# Patient Record
Sex: FEMALE | Race: WHITE | Employment: FULL TIME | URBAN - METROPOLITAN AREA
[De-identification: names, ages, dates, MRNs, and addresses within clinical notes are randomized per-mention and may not be internally consistent; named-entity substitution may affect disease eponyms.]

---

## 2017-01-12 ENCOUNTER — TELEPHONE (OUTPATIENT)
Dept: FAMILY MEDICINE CLINIC | Facility: CLINIC | Age: 27
End: 2017-01-12

## 2017-01-12 ENCOUNTER — OFFICE VISIT (OUTPATIENT)
Dept: FAMILY MEDICINE CLINIC | Facility: CLINIC | Age: 27
End: 2017-01-12

## 2017-01-12 VITALS
SYSTOLIC BLOOD PRESSURE: 108 MMHG | RESPIRATION RATE: 18 BRPM | HEART RATE: 53 BPM | WEIGHT: 125 LBS | BODY MASS INDEX: 23.6 KG/M2 | HEIGHT: 61 IN | DIASTOLIC BLOOD PRESSURE: 64 MMHG | TEMPERATURE: 98 F

## 2017-01-12 DIAGNOSIS — J06.9 UPPER RESPIRATORY TRACT INFECTION, UNSPECIFIED TYPE: ICD-10-CM

## 2017-01-12 DIAGNOSIS — H69.83 ETD (EUSTACHIAN TUBE DYSFUNCTION), BILATERAL: Primary | ICD-10-CM

## 2017-01-12 PROCEDURE — 99212 OFFICE O/P EST SF 10 MIN: CPT | Performed by: FAMILY MEDICINE

## 2017-01-12 PROCEDURE — 99213 OFFICE O/P EST LOW 20 MIN: CPT | Performed by: FAMILY MEDICINE

## 2017-01-12 RX ORDER — PREDNISONE 10 MG/1
30 TABLET ORAL DAILY
Qty: 15 TABLET | Refills: 0 | Status: SHIPPED | OUTPATIENT
Start: 2017-01-12 | End: 2017-01-17

## 2017-01-12 RX ORDER — FLUTICASONE PROPIONATE 50 MCG
2 SPRAY, SUSPENSION (ML) NASAL DAILY
Qty: 1 BOTTLE | Refills: 0 | Status: SHIPPED | OUTPATIENT
Start: 2017-01-12 | End: 2017-05-12

## 2017-01-12 NOTE — TELEPHONE ENCOUNTER
Per pt, pt is having possible URI; ear pain, sore throat, and also stuffy nose, congestion but no fever. Pt took Mucinex but did not help much,  Pt would like to come and see VS today if possible,  Offered appt with other dr but refused.

## 2017-01-12 NOTE — PROGRESS NOTES
Patient ID: Lawrence Rosen is a 27year old female. HPI  Patient presents with:  Cold: Pt c/o bilateral ear pain/pressure, throat pain, sinus congestion, and cough for past 2 days. Pain 5/10.      She states for the last 2 days she has had some sinus co reviewed. Blood pressure 108/64, pulse 53, temperature 98.2 °F (36.8 °C), temperature source Oral, resp. rate 18, height 5' 1\" (1.549 m), weight 125 lb (56.7 kg), not currently breastfeeding.          ASSESSMENT/PLAN:     Diagnoses and all orders for th

## 2017-01-12 NOTE — TELEPHONE ENCOUNTER
Pt informed of response below by VS; pt states can come in at 2:45pm today and was added with assistance of CSS.

## 2017-01-12 NOTE — TELEPHONE ENCOUNTER
Patient states for two days has had bilateral ear pain, sore throat, stuffy nose, head congestion, chest congestion. Denies fever, SOB, chest pain. No SDS appts available today.  Patient has a meeting from 1-2PM today in [place] but can come in any ti

## 2017-01-16 ENCOUNTER — TELEPHONE (OUTPATIENT)
Dept: GASTROENTEROLOGY | Facility: CLINIC | Age: 27
End: 2017-01-16

## 2017-01-16 ENCOUNTER — OFFICE VISIT (OUTPATIENT)
Dept: PODIATRY CLINIC | Facility: CLINIC | Age: 27
End: 2017-01-16

## 2017-01-16 DIAGNOSIS — M77.41 METATARSALGIA OF BOTH FEET: Primary | ICD-10-CM

## 2017-01-16 DIAGNOSIS — M77.42 METATARSALGIA OF BOTH FEET: Primary | ICD-10-CM

## 2017-01-16 PROCEDURE — L3060 FOOT ARCH SUPP LONGITUD/META: HCPCS | Performed by: PODIATRIST

## 2017-01-16 PROCEDURE — 99243 OFF/OP CNSLTJ NEW/EST LOW 30: CPT | Performed by: PODIATRIST

## 2017-01-16 PROCEDURE — 99212 OFFICE O/P EST SF 10 MIN: CPT | Performed by: PODIATRIST

## 2017-01-16 NOTE — PROGRESS NOTES
HPI:    Patient ID: Marcos Ch is a 27year old female. HPI  This pleasant 47-year-old female presents as a new patient to me on consult from [address] Patient's chief complaint is pain on the ball of her foot where there is a callus.   She states range of motion of any of the metatarsophalangeal joints. There seems to be adequate ankle joint range of motion and good subtalar and midtarsal joint ranges of motion as well. She has a moderately pronated functional position.   Some shoes such as gym st

## 2017-01-30 ENCOUNTER — OFFICE VISIT (OUTPATIENT)
Dept: FAMILY MEDICINE CLINIC | Facility: CLINIC | Age: 27
End: 2017-01-30

## 2017-01-30 ENCOUNTER — PATIENT MESSAGE (OUTPATIENT)
Dept: FAMILY MEDICINE CLINIC | Facility: CLINIC | Age: 27
End: 2017-01-30

## 2017-01-30 VITALS
HEART RATE: 70 BPM | BODY MASS INDEX: 23.53 KG/M2 | WEIGHT: 124.63 LBS | TEMPERATURE: 98 F | SYSTOLIC BLOOD PRESSURE: 114 MMHG | DIASTOLIC BLOOD PRESSURE: 71 MMHG | HEIGHT: 61 IN | RESPIRATION RATE: 12 BRPM

## 2017-01-30 DIAGNOSIS — B07.9 VIRAL WARTS, UNSPECIFIED TYPE: ICD-10-CM

## 2017-01-30 DIAGNOSIS — J32.9 SINUSITIS, UNSPECIFIED CHRONICITY, UNSPECIFIED LOCATION: Primary | ICD-10-CM

## 2017-01-30 DIAGNOSIS — L02.91 ABSCESS: ICD-10-CM

## 2017-01-30 PROCEDURE — 99214 OFFICE O/P EST MOD 30 MIN: CPT | Performed by: FAMILY MEDICINE

## 2017-01-30 PROCEDURE — 99212 OFFICE O/P EST SF 10 MIN: CPT | Performed by: FAMILY MEDICINE

## 2017-01-30 PROCEDURE — 17110 DESTRUCTION B9 LES UP TO 14: CPT | Performed by: FAMILY MEDICINE

## 2017-01-30 RX ORDER — AMOXICILLIN AND CLAVULANATE POTASSIUM 875; 125 MG/1; MG/1
1 TABLET, FILM COATED ORAL 2 TIMES DAILY
Qty: 20 TABLET | Refills: 0 | Status: SHIPPED | OUTPATIENT
Start: 2017-01-30 | End: 2017-02-09

## 2017-01-31 NOTE — PROGRESS NOTES
Patient ID: Adama Melo is a 27year old female. HPI  Patient presents with:  Rash  Ear Problem: clogged     The last time I saw her she had eustachian tube dysfunction. She states she still feels very stuffy in her nose and she has been coughing. Cardiovascular: Normal rate, regular rhythm and normal heart sounds. Pulmonary/Chest: Effort normal and breath sounds normal. No respiratory distress. Neurological: She is alert and oriented to person, place, and time. Skin: Skin is warm.         2 m

## 2017-02-06 ENCOUNTER — OFFICE VISIT (OUTPATIENT)
Dept: PODIATRY CLINIC | Facility: CLINIC | Age: 27
End: 2017-02-06

## 2017-02-06 DIAGNOSIS — M77.41 METATARSALGIA OF BOTH FEET: Primary | ICD-10-CM

## 2017-02-06 DIAGNOSIS — M77.42 METATARSALGIA OF BOTH FEET: Primary | ICD-10-CM

## 2017-02-06 PROCEDURE — 99212 OFFICE O/P EST SF 10 MIN: CPT | Performed by: PODIATRIST

## 2017-02-06 PROCEDURE — 99213 OFFICE O/P EST LOW 20 MIN: CPT | Performed by: PODIATRIST

## 2017-02-06 NOTE — PROGRESS NOTES
HPI:    Patient ID: Em Calderon is a 27year old female. HPI  This 53-year-old female presents for follow-up. Patient states that support matters. She did have some frustrations depending upon the shoe that she was wearing.   The temporary device di

## 2017-02-10 ENCOUNTER — TELEPHONE (OUTPATIENT)
Dept: FAMILY MEDICINE CLINIC | Facility: CLINIC | Age: 27
End: 2017-02-10

## 2017-02-10 RX ORDER — MONTELUKAST SODIUM 10 MG/1
10 TABLET ORAL DAILY
Qty: 30 TABLET | Refills: 3 | Status: SHIPPED | OUTPATIENT
Start: 2017-02-10 | End: 2017-04-17

## 2017-02-10 NOTE — TELEPHONE ENCOUNTER
From   Mitchel Rosales    To   Sondra Alvarado DO    Sent   2/10/2017  7:40 AM         Good morning,   The past two days I've felt so sick again, congested, ears etc. I feel like I can't shake this cold and the mess aren't helping me.  And they keep me awake

## 2017-02-10 NOTE — TELEPHONE ENCOUNTER
From: Anne Humphreys  Sent: 2/10/2017 7:40 AM CST  To: Laurie Fm  Clinical Staff  Subject: RE: Visit Follow-up Question    Good morning,  The past two days I've felt so sick again, congested, ears etc. I feel like I can't shake this cold and the mess aren't

## 2017-02-10 NOTE — TELEPHONE ENCOUNTER
please see pt mychart message below. Pt inform me that she has nose congestion, dry cough and her ears feel clogged. She also mention that since she started the abx she has been having a hard time sleeping. Pt denied fever,sob, wheezing.  She is not s

## 2017-02-10 NOTE — TELEPHONE ENCOUNTER
Pt notififed, will obtain rx. Advised to contact clinic if symptoms progress. Report to ER if they become severe, or if CP or SOB develop. Pt verbalizes understanding and agrees with plan.

## 2017-02-10 NOTE — TELEPHONE ENCOUNTER
Stay on the Flonase. We will start you on Singulair which she will take at bedtime. This should work for 24 hours and it should help with the nasal congestion and congestion in the ears as well.

## 2017-03-06 ENCOUNTER — PATIENT MESSAGE (OUTPATIENT)
Dept: GASTROENTEROLOGY | Facility: CLINIC | Age: 27
End: 2017-03-06

## 2017-03-06 ENCOUNTER — TELEPHONE (OUTPATIENT)
Dept: GASTROENTEROLOGY | Facility: CLINIC | Age: 27
End: 2017-03-06

## 2017-03-06 DIAGNOSIS — R10.84 ABDOMINAL PAIN, GENERALIZED: Primary | ICD-10-CM

## 2017-03-06 NOTE — TELEPHONE ENCOUNTER
Pt is contacted and she confirmed her email for ongoing symptoms and request for appt.; she is made aware that Dr. Erwin Heredia is out of office until next week so message will be sent to Dr. Deven Cavazos; pt states that she does not want to wait until even 2 weeks from

## 2017-03-06 NOTE — TELEPHONE ENCOUNTER
Dr. Carlin Goins- on call for Dr. Shwetha Wells- please see below email and communication from pt and advise for appt.; she is requesting to see any GI MD in Dr. Stoddard Leaven absence and is insisting on it being this week; she had last office visit with Dr. Shwetha Wells on 8/16/16

## 2017-03-06 NOTE — TELEPHONE ENCOUNTER
I do not have any more availability for appointments this week.   Please ask Dr. Brodie Walsh or Christen Conway or MITRA Bryson

## 2017-03-06 NOTE — TELEPHONE ENCOUNTER
Xavier Hopes    To   Isabell Duarte MD    Sent   3/6/2017 12:01 PM         Hello-   I called to schedule an appointment but looks like your booked until June.  I have severe pain in my stomach and bloating, my symptoms never really went away bu

## 2017-03-07 NOTE — TELEPHONE ENCOUNTER
Dr. Corrine He- please see below communications re: this pt of Dr. Ching Zayas; Nik Cruz cannot see her due to [address] insurance; please advise if you can see her this week.

## 2017-03-08 DIAGNOSIS — B00.1 HERPES LABIALIS: ICD-10-CM

## 2017-03-08 RX ORDER — VALACYCLOVIR HYDROCHLORIDE 1 G/1
TABLET, FILM COATED ORAL
Qty: 20 TABLET | Refills: 0 | Status: SHIPPED | OUTPATIENT
Start: 2017-03-08 | End: 2017-04-17

## 2017-03-08 RX ORDER — POLYETHYLENE GLYCOL 3350 17 G/17G
17 POWDER, FOR SOLUTION ORAL DAILY
Qty: 7 PACKET | Refills: 0 | Status: SHIPPED | OUTPATIENT
Start: 2017-03-08 | End: 2017-04-04

## 2017-03-08 NOTE — TELEPHONE ENCOUNTER
Dr. Daniel Mcdonald- please see all below communications and advise for appt with you (3/20/17 when you are on call?); thanks!

## 2017-03-08 NOTE — TELEPHONE ENCOUNTER
Chart reviewed. Patient has chronic constipation, negative colonoscopy. Please have her try a course of MiraLAX 17 g daily x 7 days. I will not be able to see her this week. Please arrange appointment with Dr. Tahmina Bhatt next week.

## 2017-03-08 NOTE — TELEPHONE ENCOUNTER
Dr. Julio Cesar Moeller for Dr. Shawn Carrera, please advise on Pended Valacyclovir rx. Thank you  Reason for Call/Chief Complaint: Cold Sores  Onset: 2 days ago  Nursing Assessment/Associated Symptoms: Cold sore on lip, states having two and feeling pulsating feeling on lips.   Sta

## 2017-03-08 NOTE — TELEPHONE ENCOUNTER
Pt. Is calling to f/up on getting her medication refilled. She states that she ran out. She states that her lips are pulsating as she has not taken her medication.

## 2017-03-08 NOTE — TELEPHONE ENCOUNTER
Pt is contacted and made aware of below recommendations from Dr. Vaishnavi Guerrero; pt states that she tried Miralax already and it did not work; antacids do not help either, per her report; she states the following: \"I am barely eating because I get sick 1 hour after

## 2017-03-08 NOTE — TELEPHONE ENCOUNTER
Pt states she has a cold sore, that is the reason she needs medication. Pt states she had an outbreak, and has cold sore on lip, Pt states cold sores on her lip and now she feels like they are pulsating.    Pt is asking if  can prescribe VALACYCL

## 2017-03-09 ENCOUNTER — OFFICE VISIT (OUTPATIENT)
Dept: FAMILY MEDICINE CLINIC | Facility: CLINIC | Age: 27
End: 2017-03-09

## 2017-03-09 VITALS
WEIGHT: 123 LBS | HEIGHT: 61 IN | HEART RATE: 65 BPM | DIASTOLIC BLOOD PRESSURE: 65 MMHG | SYSTOLIC BLOOD PRESSURE: 103 MMHG | TEMPERATURE: 99 F | BODY MASS INDEX: 23.22 KG/M2

## 2017-03-09 DIAGNOSIS — K58.1 IRRITABLE BOWEL SYNDROME WITH CONSTIPATION: ICD-10-CM

## 2017-03-09 DIAGNOSIS — R14.0 ABDOMINAL BLOATING: ICD-10-CM

## 2017-03-09 DIAGNOSIS — R14.2 ERUCTATION: ICD-10-CM

## 2017-03-09 DIAGNOSIS — J32.9 SINUSITIS, UNSPECIFIED CHRONICITY, UNSPECIFIED LOCATION: Primary | ICD-10-CM

## 2017-03-09 PROCEDURE — 99212 OFFICE O/P EST SF 10 MIN: CPT | Performed by: FAMILY MEDICINE

## 2017-03-09 PROCEDURE — 99215 OFFICE O/P EST HI 40 MIN: CPT | Performed by: FAMILY MEDICINE

## 2017-03-09 RX ORDER — VALACYCLOVIR HYDROCHLORIDE 1 G/1
TABLET, FILM COATED ORAL
Qty: 20 TABLET | Refills: 1 | Status: CANCELLED | OUTPATIENT
Start: 2017-03-09

## 2017-03-09 RX ORDER — CLARITHROMYCIN 500 MG/1
500 TABLET, COATED ORAL 2 TIMES DAILY
Qty: 20 TABLET | Refills: 0 | Status: SHIPPED | OUTPATIENT
Start: 2017-03-09 | End: 2017-03-19

## 2017-03-09 RX ORDER — DICYCLOMINE HYDROCHLORIDE 10 MG/1
CAPSULE ORAL
Refills: 3 | COMMUNITY
Start: 2017-02-11 | End: 2017-07-13

## 2017-03-09 NOTE — TELEPHONE ENCOUNTER
From: Calvin Alexander  To:  Emely Jefferson DO  Sent: 3/8/2017 8:11 AM CST  Subject: Medication Renewal Request    Original authorizing provider: DO Calvin Zuleta would like a refill of the following medications:  ValACYclovir HCl 1 G Oral

## 2017-03-09 NOTE — PROGRESS NOTES
Patient ID: Jenni Knight is a 27year old female. HPI  Patient presents with:  Cold   she has had a colonoscopy in the past.  She states since May 2016 she has been feeling gassy and burping quite loud.   She states she even took a voice recording of Oral Tab Take 1 tablet (10 mg total) by mouth daily. Disp: 30 tablet Rfl: 3   Fluticasone Propionate 50 MCG/ACT Nasal Suspension 2 sprays by Nasal route daily.  Disp: 1 Bottle Rfl: 0   BuPROPion HCl ER, SR, 150 MG Oral Tablet 12 Hr Take 1 tablet (150 mg tot sure to take probiotics with this. Abdominal bloating  -     XR UPPER GI TRACT + SMALL BOWEL (CPT=74245); Future  In the meantime I will order an upper GI with small bowel and see if we can find any reason for her symptoms.   Irritable bowel syndrome with

## 2017-03-13 ENCOUNTER — PATIENT MESSAGE (OUTPATIENT)
Dept: FAMILY MEDICINE CLINIC | Facility: CLINIC | Age: 27
End: 2017-03-13

## 2017-03-13 NOTE — TELEPHONE ENCOUNTER
These severe symptoms were discussed exhaustively last 2 appointments and investigated with EGD and colonoscopy examinations 2016. CT scan was apparently performed in the [facility] emergency room. I will not be able to see this week.   Okay to add on f

## 2017-03-13 NOTE — TELEPHONE ENCOUNTER
Pt is contacted re: appt with Dr. Ed Jenkins and this is booked on 3/22/17 at 0830, arrival time 0815; she could not come later for appt due to work schedule that starts at 1000; she is agreeable with this; she is going to have UGI X-ray done on 3/15/17 which

## 2017-03-13 NOTE — TELEPHONE ENCOUNTER
From: Bennington Age  To: Mitra Monson MD  Sent: 3/6/2017 12:01 PM CST  Subject: Other    Hello-  I called to schedule an appointment but looks like your booked until June.  I have severe pain in my stomach and bloating, my symptoms never reall

## 2017-03-15 ENCOUNTER — HOSPITAL ENCOUNTER (OUTPATIENT)
Dept: GENERAL RADIOLOGY | Facility: HOSPITAL | Age: 27
Discharge: HOME | End: 2017-03-15
Attending: FAMILY MEDICINE
Payer: MEDICAID

## 2017-03-15 DIAGNOSIS — R14.0 ABDOMINAL BLOATING: ICD-10-CM

## 2017-03-15 DIAGNOSIS — R14.2 ERUCTATION: ICD-10-CM

## 2017-03-15 DIAGNOSIS — K58.1 IRRITABLE BOWEL SYNDROME WITH CONSTIPATION: ICD-10-CM

## 2017-03-15 PROCEDURE — 74249 XR UPPER GI AIR CONTRAST+SBS (CPT=74249): CPT

## 2017-03-15 NOTE — PATIENT INSTRUCTIONS
Your exam included the use of barium contrast, which may cause mild constipation. We recommend that you drink extra water for the next 24 hrs to help eliminate the barium for your system. You may take a mild laxative the evening of the exam if needed.

## 2017-03-16 NOTE — TELEPHONE ENCOUNTER
From: Porras Bourbon  To:  Sheree Virgen DO  Sent: 3/13/2017 8:39 AM CDT  Subject: Visit Follow-up Question    Morning,     I was wondering if you looked into the name of that digestive stool parasisology test ? (CDSA/P)     Thank you,  Jessica

## 2017-03-16 NOTE — TELEPHONE ENCOUNTER
See today's email to patient. Upper GI x-ray March 15th was essentially negative; normal small bowel and normal motility/transit. Nuclear medicine Meckel scan ordered.

## 2017-03-17 ENCOUNTER — TELEPHONE (OUTPATIENT)
Dept: FAMILY MEDICINE CLINIC | Facility: CLINIC | Age: 27
End: 2017-03-17

## 2017-03-17 NOTE — TELEPHONE ENCOUNTER
Patient notified of MD's recommendation. Patient verbalized understanding. She will keep appt for GI as scheduled.

## 2017-03-17 NOTE — TELEPHONE ENCOUNTER
Let patient know that I spoke to gastroenterology and they do not feel that Citrobacter freundii is a valid reason for IBS.   Plus they state considering that you have been on Augmentin and clarithromycin, that would have killed this bug anyway and you woul

## 2017-03-21 ENCOUNTER — HOSPITAL ENCOUNTER (OUTPATIENT)
Dept: NUCLEAR MEDICINE | Facility: HOSPITAL | Age: 27
Discharge: HOME | End: 2017-03-21
Attending: INTERNAL MEDICINE
Payer: MEDICAID

## 2017-03-21 DIAGNOSIS — R10.84 ABDOMINAL PAIN, GENERALIZED: ICD-10-CM

## 2017-03-21 PROCEDURE — 78290 INTESTINE IMAGING: CPT

## 2017-03-22 ENCOUNTER — OFFICE VISIT (OUTPATIENT)
Dept: GASTROENTEROLOGY | Facility: CLINIC | Age: 27
End: 2017-03-22

## 2017-03-22 VITALS
SYSTOLIC BLOOD PRESSURE: 100 MMHG | DIASTOLIC BLOOD PRESSURE: 62 MMHG | HEIGHT: 61.5 IN | HEART RATE: 59 BPM | WEIGHT: 123.38 LBS | BODY MASS INDEX: 22.99 KG/M2

## 2017-03-22 DIAGNOSIS — R14.2 BELCHING SYMPTOM: ICD-10-CM

## 2017-03-22 DIAGNOSIS — R14.0 ABDOMINAL BLOATING: ICD-10-CM

## 2017-03-22 DIAGNOSIS — IMO0001 REGURGITATION: ICD-10-CM

## 2017-03-22 DIAGNOSIS — K59.01 SLOW TRANSIT CONSTIPATION: ICD-10-CM

## 2017-03-22 DIAGNOSIS — R63.4 ABNORMAL WEIGHT LOSS: Primary | ICD-10-CM

## 2017-03-22 DIAGNOSIS — R11.14 BILIOUS VOMITING WITHOUT NAUSEA: ICD-10-CM

## 2017-03-22 PROCEDURE — 99215 OFFICE O/P EST HI 40 MIN: CPT | Performed by: INTERNAL MEDICINE

## 2017-03-22 PROCEDURE — 99212 OFFICE O/P EST SF 10 MIN: CPT | Performed by: INTERNAL MEDICINE

## 2017-03-22 NOTE — PROGRESS NOTES
HPI:    Patient ID: Calvin Age is a 27year old woman and active taking medications for anxiety depression and with a IUD device. No abdominal surgical history.     Josesito Mathews returns for follow-up of ongoing severe abdominal symptoms as per recent phone ca 2014  129 pounds July 2015  130 pounds September 2015  127 pounds March 2016  124 pounds June 2016  122 pounds July 2016  125 pounds August 2016  123 pounds March 2017    ====================    Yajaira Toussaint RN at 3/8/2017  2:47 PM      Status: Signed Antonieta.  Dr. Vale Dhillon offered SSRI, antidepressants which she deferred. Constipation continues but is a bit better.  Now passing about 1 bowel movement every other day.  Ms.  Jeannette Trejo does not believe that any over-the-counter or prescription remedies have her bowels moving regularly.  Appointment on August 16th would be perfect.                         Malini No RN at 7/28/2016  2:21 PM        Status: Signed  : Malini No RN (Registered [place] [facility]   Dr. Napoleon Woodson- prior, feels bloated still, no BM in 8 days. CT imaging at [facility] shows no acute findings (reviewed); NO obstruction/mass. No comment on significant stool burden.  Since she is so bothered by her sx, will give her a trilyte washout - instructed her on how to pe can give her a referral to GI or what does doctor recommend. Pt is still feeling bloated. Pt states that Dr. Napoleon Woodson does not have any availability for another moth.                 ===================================  Previous visit 5/13/16:    Wili Mccoy worsening abdominal bloating concern.  This is postprandial, often repeatedly overnight. She does take ibuprofen 800 mg capsules.  She denies taking that regularly.  She estimates that she takes a dose less than once per week.     No recalled impact of 3/15/2017 at 17:21        Approved by (CST): Isi Ramos MD on 3/15/2017 at 17:23            =====  CONCLUSION:   1. There is a small sliding hiatal hernia. No definite evidence of gastroesophageal reflux. Normal stomach and duodenum.  Normal small bowel advanced  under direct visualization through the oropharynx into the esophagus, on  down through the stomach and pylorus to the duodenal bulb and second  portion of the duodenum.  Retroflexion was performed in the stomach.     EGD FINDINGS:  The esophagus gluten-sensitive enteropathy,       B.  Stomach, biopsy:     *  Antral/oxyntic mucosa with mild chronic inactive gastritis and mild   chemical/reactive gastropathy.     *  Diff-Quik stain is negative for Helicobacter pylori organisms (appropriate   controls    descending,  transverse, ascending colon to the cecum today.  Some petechiae,    mild  erythematous change in the distal rectal vault only.  No    obstructing  process and no greater inflammatory process.      The cecum and ileocecal valve were entirely tablet (150 mg total) by mouth once daily. Disp: 30 tablet Rfl: 3   Levonorgestrel 13.5 MG Intrauterine IUD by Intrauterine route. Disp:  Rfl:    Multiple Vitamin (MULTIVITAMINS) Oral Cap Take  by mouth.  Disp:  Rfl:    VALACYCLOVIR HCL 1 G Oral Tab TAKE TW linaclotide have not helped. Rifaximin did not help. Seen in the [facility] emergency room approximately July 21, 2016; labs and CT scan performed.     Returns for follow-up August 16, 2016.  Constipation is a bit better, partially due to rec types were placed in this encounter.        Meds This Visit:  No prescriptions requested or ordered in this encounter    Imaging & Referrals:  None       #[id number]

## 2017-03-27 ENCOUNTER — TELEPHONE (OUTPATIENT)
Dept: GASTROENTEROLOGY | Facility: CLINIC | Age: 27
End: 2017-03-27

## 2017-03-27 NOTE — TELEPHONE ENCOUNTER
Pt is notified now that she can call central scheduling phone no for MRE appt.; she is driving now and will call back if she cannot locate this phone no.

## 2017-03-27 NOTE — TELEPHONE ENCOUNTER
Pt called insurance re: MRI. She states that approval # from [facility] is [id number]/MRI of abdomen and approval # for MRI of pelvis was [id number]. Pt was told to call this office once she had auth # so that she could schedule.

## 2017-03-27 NOTE — TELEPHONE ENCOUNTER
Per Rodo Pastor in managed care, pt was left message on 3/24/17 to call back to pick facility for MRE; per referral, it seems that pt chose [address] [place] [address].

## 2017-03-29 ENCOUNTER — TELEPHONE (OUTPATIENT)
Dept: GASTROENTEROLOGY | Facility: CLINIC | Age: 27
End: 2017-03-29

## 2017-03-29 NOTE — TELEPHONE ENCOUNTER
GI RNs–  Please call/advise Jessica that this oral MRI contrast is completely different from the barium contrast which made her sick. This is a watery drinkable contrast which should not cause such severe constipation.   If she is worried, we can prescribe h

## 2017-03-29 NOTE — TELEPHONE ENCOUNTER
Pt is contacted and made aware of below communication from Dr. Alistair Montoya re: MRI contrast; pt verbalized understanding of this and is agreeable with getting rx for Golytely just in case she needs to drink it; MRI is scheduled for 4/11/17; pt confirmed pharmac

## 2017-03-29 NOTE — TELEPHONE ENCOUNTER
Dr. Tristin Krause- please see below communication from pt requesting Golytely in case she needs to take it post-MRI on 4/11/17; rx is pended for same; thanks!

## 2017-04-04 ENCOUNTER — OFFICE VISIT (OUTPATIENT)
Dept: OBGYN CLINIC | Facility: CLINIC | Age: 27
End: 2017-04-04

## 2017-04-04 VITALS — DIASTOLIC BLOOD PRESSURE: 60 MMHG | WEIGHT: 123 LBS | SYSTOLIC BLOOD PRESSURE: 100 MMHG | BODY MASS INDEX: 23 KG/M2

## 2017-04-04 DIAGNOSIS — Z30.431 IUD CHECK UP: Primary | ICD-10-CM

## 2017-04-04 PROCEDURE — 99213 OFFICE O/P EST LOW 20 MIN: CPT | Performed by: OBSTETRICS & GYNECOLOGY

## 2017-04-04 NOTE — PROGRESS NOTES
HPI:    Patient ID: Barbara Garcia is a 27year old female. HPI  Patient desires Sarah Sicilian IUD check. Reports some bloating and cramping but suspects that it is GI related. No menses with Nicky. Review of Systems   Constitutional: Negative.     Respirat encounter    Imaging & Referrals:  None       QA#[id number]

## 2017-04-11 ENCOUNTER — HOSPITAL ENCOUNTER (OUTPATIENT)
Dept: MRI IMAGING | Facility: HOSPITAL | Age: 27
Discharge: HOME | End: 2017-04-11
Attending: INTERNAL MEDICINE
Payer: MEDICAID

## 2017-04-11 DIAGNOSIS — R63.4 ABNORMAL WEIGHT LOSS: ICD-10-CM

## 2017-04-11 DIAGNOSIS — R11.14 BILIOUS VOMITING WITHOUT NAUSEA: ICD-10-CM

## 2017-04-11 PROCEDURE — 74183 MRI ABD W/O CNTR FLWD CNTR: CPT

## 2017-04-11 PROCEDURE — A9575 INJ GADOTERATE MEGLUMI 0.1ML: HCPCS | Performed by: INTERNAL MEDICINE

## 2017-04-11 PROCEDURE — 72197 MRI PELVIS W/O & W/DYE: CPT

## 2017-04-14 ENCOUNTER — TELEPHONE (OUTPATIENT)
Dept: GASTROENTEROLOGY | Facility: CLINIC | Age: 27
End: 2017-04-14

## 2017-04-14 DIAGNOSIS — R10.84 ABDOMINAL PAIN, GENERALIZED: Primary | ICD-10-CM

## 2017-04-14 NOTE — TELEPHONE ENCOUNTER
Dr. Reji Zayas- please see below communication from pt and advise; you most recently saw her on 3/22/17; she is also asking about MRI/MRE results from 4/11/17; per your office note: If dysmotility continues to be a concern, likely will need to refer to [facility]

## 2017-04-14 NOTE — TELEPHONE ENCOUNTER
Pt is contacted re: below communication and she states the following: \"the symptoms are getting worse\"; she relates being in fetal position for 2 hours last night due to sharp pain in stomach; she does not wish to return to ER at any given time; she repo

## 2017-04-14 NOTE — TELEPHONE ENCOUNTER
Gregory Denville will need to be seen at a tertiary center. [address] certainly would be nice. [facility] is excellent. If she can find a way to get there, she should see Dr. Mai Patel at [facility]. There is not much more I can do here.

## 2017-04-14 NOTE — TELEPHONE ENCOUNTER
Pt is made aware of below communication from Dr. Telly Alston and she is reluctantly agreeable with plan, but states the following: \"I heard the same thing 3 months ago (that results are normal) and nothing has been done so far to help me out\"; she is looking

## 2017-04-14 NOTE — TELEPHONE ENCOUNTER
MRE coupled with last year's colonoscopy exam shows normal intestine. Acacia Ha will need to go back to [place]. I will research where we could refer her.   She may benefit from a medication to help stimulate her intestines called tegaserod which is not

## 2017-04-14 NOTE — TELEPHONE ENCOUNTER
Dr. Hever Maynard- please see below from pt.; I don't know if your calling her would help; she seemed very emotional about her situation and I don't believe her insurance, [employer], will be accepted at any tertiary facility; she seemed quite upset and distraugh

## 2017-04-15 ENCOUNTER — PATIENT MESSAGE (OUTPATIENT)
Dept: GASTROENTEROLOGY | Facility: CLINIC | Age: 27
End: 2017-04-15

## 2017-04-15 DIAGNOSIS — K59.00 CONSTIPATION, UNSPECIFIED CONSTIPATION TYPE: ICD-10-CM

## 2017-04-15 DIAGNOSIS — R10.84 ABDOMINAL PAIN, GENERALIZED: Primary | ICD-10-CM

## 2017-04-17 NOTE — PROGRESS NOTES
Patient ID: Coty Wilcox is a 27year old female. HPI  Patient presents with:  Medication Follow-Up: pt wants to discuss bupropion medication      She has seen gastroenterologist.  She is very frustrated with all of this.   She states she was talking mouth once daily. Disp: 30 tablet Rfl: 3   Levonorgestrel 13.5 MG Intrauterine IUD by Intrauterine route. Disp:  Rfl:    Multiple Vitamin (MULTIVITAMINS) Oral Cap Take  by mouth.  Disp:  Rfl:      Allergies:  Codeine                    PHYSICAL EXAM:   Phys total) by mouth 2 (two) times daily as needed for Heartburn. Abdominal bloating  -     famoTIDine (PEPCID) 20 MG Oral Tab; Take 1 tablet (20 mg total) by mouth 2 (two) times daily as needed for Heartburn.     Allergic rhinitis due to pollen, unspecified

## 2017-04-17 NOTE — TELEPHONE ENCOUNTER
From: Lawrence Rosen  To: Mandie Chavez MD  Sent: 4/15/2017 11:08 AM CDT  Subject: Visit Follow-up Question    Dr. Jesús White,    I have a few questions and also some i for from a article that I read.  It suggested that maybe it could be something w

## 2017-04-19 ENCOUNTER — APPOINTMENT (OUTPATIENT)
Dept: LAB | Age: 27
End: 2017-04-19
Attending: INTERNAL MEDICINE
Payer: MEDICAID

## 2017-04-19 NOTE — TELEPHONE ENCOUNTER
Per Vini Tai in managed care, pt needs to contact [employer] and request a  to look for tertiary centre GI MD who will accept her insurance; Dr. Sharon Mahajan is out of network and will not accept [employer]; pt can also look into financial programs (A

## 2017-04-19 NOTE — TELEPHONE ENCOUNTER
GI RNs–  Thank you for update. She will need tertiary center referral.  Harris Monterroso needs to understand that we cannot help her further here.     The Viibryd medication and consideration for anything else for stress, anxiety, depression could help a LOT wit

## 2017-04-19 NOTE — TELEPHONE ENCOUNTER
Pt is contacted re: below recommendations from Dr. Eliazar Smith re: referral to MD at [address] as well as collecting 24 hour urine for porphyrins; pt is agreeable with both; she then mentioned her below email re: Jarrett Flaherty and states the following: sh

## 2017-04-19 NOTE — TELEPHONE ENCOUNTER
Pt sent Zent[employer] message:    From     Jessica Hastings      To     Em Gi Clinical Staff      Sent     4/18/2017  9:54 AM            Hello-     Can we look into Viibryd to help with the ibs?

## 2017-04-24 ENCOUNTER — TELEPHONE (OUTPATIENT)
Dept: FAMILY MEDICINE CLINIC | Facility: CLINIC | Age: 27
End: 2017-04-24

## 2017-04-24 ENCOUNTER — PATIENT MESSAGE (OUTPATIENT)
Dept: FAMILY MEDICINE CLINIC | Facility: CLINIC | Age: 27
End: 2017-04-24

## 2017-04-24 DIAGNOSIS — R10.84 GENERALIZED ABDOMINAL PAIN: ICD-10-CM

## 2017-04-24 DIAGNOSIS — K58.1 IRRITABLE BOWEL SYNDROME WITH CONSTIPATION: Primary | ICD-10-CM

## 2017-04-24 NOTE — TELEPHONE ENCOUNTER
MORALES Pan with the patient further regarding her request to see a dietician. Pt states she is frustrated because she is still having a lot of stomach issues. She stated that Dr. Teresa Keane referred her to see [place] Sharyle Argue [place] [facility].   D

## 2017-04-24 NOTE — TELEPHONE ENCOUNTER
LMTCB Please transfer to [phone] until 4:30 and [phone] after  Pt seeing GI received results from MRI and requesting referral for Dietician. Please advise  Referral pended for review.

## 2017-04-24 NOTE — TELEPHONE ENCOUNTER
From: Kwan Underwood  To: Annie Collins DO  Sent: 4/24/2017 8:13 AM CDT  Subject: Test Results Question    Hello-    Do you think I can have a referral to a dietician? Maybe that will help with my stomach issues?

## 2017-04-24 NOTE — TELEPHONE ENCOUNTER
----- Message from [address] [place] [address] [address] Generic sent at 4/24/2017  8:13 AM CDT -----  Regarding: Test Results Question  Contact: [phone]  Radha-    Do you think I can have a referral to a dietician? Maybe that will help with my stomach issues?

## 2017-04-26 NOTE — TELEPHONE ENCOUNTER
Pt is contacted and made aware of below communication from Dr. Reji Zayas about which she verbalized understanding/agreement with plan; she is trying to find time to get 24 hour urine done, but is having trouble with this due to her work and school schedules;

## 2017-05-02 ENCOUNTER — PATIENT MESSAGE (OUTPATIENT)
Dept: FAMILY MEDICINE CLINIC | Facility: CLINIC | Age: 27
End: 2017-05-02

## 2017-05-03 ENCOUNTER — APPOINTMENT (OUTPATIENT)
Dept: LAB | Age: 27
End: 2017-05-03
Attending: INTERNAL MEDICINE
Payer: MEDICAID

## 2017-05-03 DIAGNOSIS — R10.84 ABDOMINAL PAIN, GENERALIZED: ICD-10-CM

## 2017-05-03 PROCEDURE — 82656 EL-1 FECAL QUAL/SEMIQ: CPT

## 2017-05-04 ENCOUNTER — APPOINTMENT (OUTPATIENT)
Dept: LAB | Age: 27
End: 2017-05-04
Attending: INTERNAL MEDICINE
Payer: MEDICAID

## 2017-05-04 DIAGNOSIS — R10.84 ABDOMINAL PAIN, GENERALIZED: ICD-10-CM

## 2017-05-04 PROCEDURE — 82570 ASSAY OF URINE CREATININE: CPT

## 2017-05-04 PROCEDURE — 81003 URINALYSIS AUTO W/O SCOPE: CPT

## 2017-05-04 PROCEDURE — 84120 ASSAY OF URINE PORPHYRINS: CPT

## 2017-05-04 PROCEDURE — 82135 ASSAY AMINOLEVULINIC ACID: CPT

## 2017-05-04 PROCEDURE — 84110 ASSAY OF PORPHOBILINOGEN: CPT

## 2017-05-05 NOTE — TELEPHONE ENCOUNTER
GI RNs -- I need help arranging \"authorization\" for Jessica to see Dr. Kati Peres at [place] [phone] for severe abdominal pain and constipation, suspected IBS.   Referral to [facility] level is due to severe symptoms refractory to convention

## 2017-05-11 ENCOUNTER — TELEPHONE (OUTPATIENT)
Dept: GASTROENTEROLOGY | Facility: CLINIC | Age: 27
End: 2017-05-11

## 2017-05-11 NOTE — TELEPHONE ENCOUNTER
Dr. Gustavo Hardy, please be advised that Dr. Mendel Rand is not in network, referral has been cancelled.  Need to redirect patient to an in . Thank you

## 2017-05-11 NOTE — TELEPHONE ENCOUNTER
Patient mailed missed order letter for morning Cortisol level to mailing address with copy of lab requisition.

## 2017-05-16 NOTE — TELEPHONE ENCOUNTER
GI RNs -  Please call Anna Haji ?  or anyone else in managed care to find out which [address] [place] [address] are in network

## 2017-05-17 NOTE — TELEPHONE ENCOUNTER
Sent to Dr Abdi Orozco as FYI--Pt contacted and aware  nor any other academic center accepts [employer].  She spoke to [facility], who states if letter written per Dr Abdi Orozco and faxed to them (letter in epic )--AND there is a form that Dr Kadie Gaines could fill ou

## 2017-05-27 RX ORDER — VALACYCLOVIR HYDROCHLORIDE 1 G/1
TABLET, FILM COATED ORAL
Qty: 20 TABLET | Refills: 0 | Status: SHIPPED | OUTPATIENT
Start: 2017-05-27 | End: 2017-07-13

## 2017-06-10 NOTE — TELEPHONE ENCOUNTER
Refill Protocol Appointment Criteria: Refilled per protocol    · Appointment scheduled in the past 6 months or in the next 3 months  Recent Visits       Provider Department Primary Dx    1 month ago Zora Jaramillo, [address] [facility], [address] Rajeev Sheldon

## 2017-07-13 NOTE — PROGRESS NOTES
Patient ID: My Mason is a 32year old female. HPI  Patient presents with: Anxiety   patient states she felt that the sertraline was causing her to be more emotional so she stopped. She stopped all the medications.   She seeing a homeopathic doct HENT:   Mouth/Throat: Mucous membranes are normal.   Neck: Normal range of motion. Neck supple. No thyromegaly   Cardiovascular: Normal rate, regular rhythm and normal heart sounds.     Pulmonary/Chest: Effort normal and breath sounds normal. No respirato

## 2017-08-01 ENCOUNTER — TELEPHONE (OUTPATIENT)
Dept: FAMILY MEDICINE CLINIC | Facility: CLINIC | Age: 27
End: 2017-08-01

## 2017-08-01 RX ORDER — VALACYCLOVIR HYDROCHLORIDE 1 G/1
TABLET, FILM COATED ORAL
Qty: 20 TABLET | Refills: 0 | Status: SHIPPED | OUTPATIENT
Start: 2017-08-01 | End: 2017-08-28

## 2017-08-01 NOTE — TELEPHONE ENCOUNTER
Pharmacy called in requesting a new rx for pt's VALACYCLOVIR HCL 1 G Oral Tab, please. Pharmacy also states pt is completely out.

## 2017-08-01 NOTE — TELEPHONE ENCOUNTER
Refill Protocol Appointment Criteria   Signed Prescriptions Disp Refills    ValACYclovir HCl 1 G Oral Tab 20 tablet 0      Sig: TAKE TWO TABLETS BY MOUTH EVERY TWELVE HOURS        Authorizing Provider: Adrienne Iqbal        Ordering User: Su Hines

## 2017-08-07 ENCOUNTER — TELEPHONE (OUTPATIENT)
Dept: FAMILY MEDICINE CLINIC | Facility: CLINIC | Age: 27
End: 2017-08-07

## 2017-08-07 RX ORDER — MONTELUKAST SODIUM 10 MG/1
10 TABLET ORAL DAILY
Qty: 30 TABLET | Refills: 6 | Status: SHIPPED | OUTPATIENT
Start: 2017-08-07 | End: 2017-12-12

## 2017-08-07 NOTE — TELEPHONE ENCOUNTER
Per pt, she want a refill on her med, and it is her allergy pills and pt stts that she forgot the name of it.

## 2017-08-07 NOTE — TELEPHONE ENCOUNTER
Patient requesting a script for her allergy medication. She thinks it might be the Singulair? Patient states her allergies have been bothering her. Patient with complaints of runny nose and ears feel clogged which is normal for her when her allergies.  Binh

## 2017-08-28 ENCOUNTER — OFFICE VISIT (OUTPATIENT)
Dept: FAMILY MEDICINE CLINIC | Facility: CLINIC | Age: 27
End: 2017-08-28

## 2017-08-28 VITALS
HEART RATE: 54 BPM | TEMPERATURE: 99 F | SYSTOLIC BLOOD PRESSURE: 111 MMHG | BODY MASS INDEX: 22.84 KG/M2 | WEIGHT: 121 LBS | HEIGHT: 61 IN | DIASTOLIC BLOOD PRESSURE: 66 MMHG

## 2017-08-28 DIAGNOSIS — B00.1 HERPES LABIALIS: ICD-10-CM

## 2017-08-28 DIAGNOSIS — J30.89 OTHER ALLERGIC RHINITIS: Primary | ICD-10-CM

## 2017-08-28 DIAGNOSIS — H69.83 DYSFUNCTION OF BOTH EUSTACHIAN TUBES: ICD-10-CM

## 2017-08-28 PROCEDURE — 99212 OFFICE O/P EST SF 10 MIN: CPT | Performed by: FAMILY MEDICINE

## 2017-08-28 PROCEDURE — 99214 OFFICE O/P EST MOD 30 MIN: CPT | Performed by: FAMILY MEDICINE

## 2017-08-28 RX ORDER — VALACYCLOVIR HYDROCHLORIDE 1 G/1
TABLET, FILM COATED ORAL
Qty: 20 TABLET | Refills: 0 | Status: SHIPPED | OUTPATIENT
Start: 2017-08-28 | End: 2018-01-27 | Stop reason: ALTCHOICE

## 2017-08-28 RX ORDER — DEXAMETHASONE 4 MG/1
4 TABLET ORAL
Qty: 5 TABLET | Refills: 0 | Status: SHIPPED | OUTPATIENT
Start: 2017-08-28 | End: 2017-09-02

## 2017-08-28 RX ORDER — FLUTICASONE PROPIONATE 50 MCG
2 SPRAY, SUSPENSION (ML) NASAL DAILY
Qty: 1 BOTTLE | Refills: 6 | Status: SHIPPED | OUTPATIENT
Start: 2017-08-28 | End: 2018-06-05

## 2017-08-28 NOTE — PROGRESS NOTES
Patient ID: Andrew Enriquez is a 32year old female. HPI  Patient presents with:  Medication Request  Sinus Problem    She states that her sinuses have been bothering her for 2 weeks. She has been feeling congested and having some sneezing.           Wt well-nourished. No distress. HENT:   Head: Normocephalic. Right Ear: Tympanic membrane normal.   Left Ear: Tympanic membrane normal.   Nose: Mucosal edema and rhinorrhea present.  No sinus tenderness   Nose: Pale boggy nasal mucosa with clear rhinorrhea

## 2017-09-15 ENCOUNTER — OFFICE VISIT (OUTPATIENT)
Dept: FAMILY MEDICINE CLINIC | Facility: CLINIC | Age: 27
End: 2017-09-15

## 2017-09-15 ENCOUNTER — APPOINTMENT (OUTPATIENT)
Dept: LAB | Age: 27
End: 2017-09-15
Attending: FAMILY MEDICINE
Payer: COMMERCIAL

## 2017-09-15 VITALS
SYSTOLIC BLOOD PRESSURE: 102 MMHG | HEART RATE: 79 BPM | HEIGHT: 61 IN | TEMPERATURE: 99 F | DIASTOLIC BLOOD PRESSURE: 66 MMHG | RESPIRATION RATE: 12 BRPM | WEIGHT: 120.63 LBS | BODY MASS INDEX: 22.78 KG/M2

## 2017-09-15 DIAGNOSIS — Z00.00 ADULT GENERAL MEDICAL EXAM: ICD-10-CM

## 2017-09-15 DIAGNOSIS — Z00.00 ADULT GENERAL MEDICAL EXAM: Primary | ICD-10-CM

## 2017-09-15 DIAGNOSIS — J30.1 ALLERGIC RHINITIS DUE TO POLLEN, UNSPECIFIED CHRONICITY, UNSPECIFIED SEASONALITY: ICD-10-CM

## 2017-09-15 LAB — RUBV IGG SER-ACNC: 78.4 IU/ML

## 2017-09-15 PROCEDURE — 86480 TB TEST CELL IMMUN MEASURE: CPT

## 2017-09-15 PROCEDURE — 86787 VARICELLA-ZOSTER ANTIBODY: CPT

## 2017-09-15 PROCEDURE — 86765 RUBEOLA ANTIBODY: CPT

## 2017-09-15 PROCEDURE — 36415 COLL VENOUS BLD VENIPUNCTURE: CPT

## 2017-09-15 PROCEDURE — 86762 RUBELLA ANTIBODY: CPT

## 2017-09-15 PROCEDURE — 86706 HEP B SURFACE ANTIBODY: CPT

## 2017-09-15 PROCEDURE — 99395 PREV VISIT EST AGE 18-39: CPT | Performed by: FAMILY MEDICINE

## 2017-09-15 PROCEDURE — 86735 MUMPS ANTIBODY: CPT

## 2017-09-15 NOTE — PROGRESS NOTES
Patient ID: Malika Bajwa is a 32year old female.     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  She did give up smoking for about 1 or 2 months but then started again due to stres Constitutional: She is oriented to person, place, and time. She appears well-developed and well-nourished. No distress. HENT:   Head: Normocephalic.    Right Ear: Tympanic membrane and ear canal normal.   Left Ear: Tympanic membrane and ear canal normal

## 2017-09-18 LAB
HBV SURFACE AB SER-ACNC: 803.06 MIU/ML (ref ?–10)
HBV SURFACE AG SERPL QL IA: REACTIVE
M TB IFN-G CD4+ BCKGRND COR BLD-ACNC: -0.01 IU/ML
M TB IFN-G CD4+ T-CELLS BLD-ACNC: 0.08 IU/ML
M TB TUBERC IFN-G BLD QL: NEGATIVE
M TB TUBERC IGNF/MITOGEN IGNF CONTROL: >10 IU/ML
MEV IGG SER-ACNC: 55.5 AU/ML (ref 30–?)
MUV IGG SER IA-ACNC: 173 AU/ML (ref 11–?)
VZV IGG SER IA-ACNC: 1559 (ref 165–?)

## 2017-09-25 ENCOUNTER — OFFICE VISIT (OUTPATIENT)
Dept: OBGYN CLINIC | Facility: CLINIC | Age: 27
End: 2017-09-25

## 2017-09-25 VITALS — BODY MASS INDEX: 23 KG/M2 | SYSTOLIC BLOOD PRESSURE: 108 MMHG | WEIGHT: 123 LBS | DIASTOLIC BLOOD PRESSURE: 67 MMHG

## 2017-09-25 DIAGNOSIS — N89.8 VAGINAL DISCHARGE: ICD-10-CM

## 2017-09-25 DIAGNOSIS — Z01.419 WELL WOMAN EXAM WITH ROUTINE GYNECOLOGICAL EXAM: Primary | ICD-10-CM

## 2017-09-25 DIAGNOSIS — Z11.3 SCREENING EXAMINATION FOR STD (SEXUALLY TRANSMITTED DISEASE): ICD-10-CM

## 2017-09-25 PROCEDURE — 99395 PREV VISIT EST AGE 18-39: CPT | Performed by: ADVANCED PRACTICE MIDWIFE

## 2017-09-25 NOTE — PROGRESS NOTES
HPI:    Patient ID: Iban Valerio is a 32year old female. Has noticed a vaginal odor self treated with monistat 1 week ago         Review of Systems   Constitutional: Negative. Respiratory: Negative. Cardiovascular: Negative.     Gastrointestinal Screened by the [address] [place] [address] and a cytotechnologist.           Screened By: Merari FALL(ASCP)     Screen Date: 08/04/15      SOURCE                            Cervical/Endocervical/Thinprep Vial Received [facility]      Past Surgical History:  2008: BREAST SURGERY      Comment: Augmentation mammoplasty   Family History   Problem Relation Age of Onset   • Diabetes Father    • Hypertension Mother    • Kidney Disease Mother    • Breast Cancer Maternal Aunt       Soci nipple discharge, no skin change and no tenderness. Abdominal: Soft. She exhibits no distension and no mass. There is no tenderness. There is no rebound and no guarding.    Genitourinary: Rectum normal and uterus normal. There is no rash, tenderness or le

## 2017-09-27 LAB
C TRACH DNA SPEC QL NAA+PROBE: NEGATIVE
N GONORRHOEA DNA SPEC QL NAA+PROBE: NEGATIVE

## 2017-09-28 LAB
GENITAL VAGINOSIS SCREEN: NEGATIVE
TRICHOMONAS SCREEN: NEGATIVE

## 2017-09-29 ENCOUNTER — TELEPHONE (OUTPATIENT)
Dept: FAMILY MEDICINE CLINIC | Facility: CLINIC | Age: 27
End: 2017-09-29

## 2017-09-30 ENCOUNTER — TELEPHONE (OUTPATIENT)
Dept: FAMILY MEDICINE CLINIC | Facility: CLINIC | Age: 27
End: 2017-09-30

## 2017-09-30 NOTE — TELEPHONE ENCOUNTER
Pt sent a [employer] message stating she cannot see a copy of her px on her MyChart. Pt asking if she can have a copy ready at the  for ? Please advise.

## 2017-10-06 ENCOUNTER — TELEPHONE (OUTPATIENT)
Dept: OBGYN CLINIC | Facility: CLINIC | Age: 27
End: 2017-10-06

## 2017-10-06 DIAGNOSIS — R87.611 PAP SMEAR OF CERVIX WITH ASCUS, CANNOT EXCLUDE HGSIL: Primary | ICD-10-CM

## 2017-10-06 NOTE — TELEPHONE ENCOUNTER
----- Message from MITRA Otto sent at 9/27/2017  5:07 PM CDT -----  ASCUS pap cannot exclude HGSIL. Patient needs a colposcopy. Please advise.   She has illinicare so will probably need a referral to gyneonc who works with Kelly Judd

## 2017-10-06 NOTE — TELEPHONE ENCOUNTER
Lmtcb, below copied from [facility]'s website    Taylor Garza MD  Practitioner  9.80 miles  [facility] [address] 4 Haylee Renner  [facility] [address]  [phone] [address], [place]  Practitioner  12. 5

## 2017-10-12 ENCOUNTER — TELEPHONE (OUTPATIENT)
Dept: FAMILY MEDICINE CLINIC | Facility: CLINIC | Age: 27
End: 2017-10-12

## 2017-10-12 ENCOUNTER — APPOINTMENT (OUTPATIENT)
Dept: OTHER | Facility: HOSPITAL | Age: 27
End: 2017-10-12
Attending: PREVENTIVE MEDICINE

## 2017-10-13 NOTE — TELEPHONE ENCOUNTER
The form has been filled out with the information from the physical from September 2017. Given to my nurse.

## 2017-10-14 NOTE — TELEPHONE ENCOUNTER
Called pt to notify form is ready for . Pt would form faxed, pt states she left card attached to the form. Received form without card it does not list a fax number. Pt states she will return call.  Pt notified the card was mistakenly thrown away due

## 2017-10-16 NOTE — TELEPHONE ENCOUNTER
Pt following up on form please fax to atten to Isabella Kenney at [phone] and the phone number just in case [phone]. Veena Pencil Veena Nealcil please advise

## 2017-11-20 NOTE — TELEPHONE ENCOUNTER
Sleep Study Follow-Up Visit:    Date on this visit: 11/20/2017    Tracy Dalton comes in today for follow-up of her treatment for sleep paralysis. She was initially seen at the [facility] for sleep paralysis since childhood. It is worsening. Her medical history is significant for depression and anxiety.    She is trying to get more sleep. She goes to bed around 10-10:30 PM. She is up by 5:30-6 AM. She is still having sleep paralysis 2-3 times per week, down from 4-6 times per wee. She feels the sleep paralysis is contributing to her not getting enough sleep because she has to get up and walk around after an event. She tries to get out of the events by calming herself and moving just her legs.   Her  has not mentioned anything about her snoring. Her  says it is difficult to get her up if she falls asleep after dinner. Between 6-10 PM she often falls asleep if just lounging. Most days she does keep herself busy. She may doze as a passenger on a long road trip, but otherwise denies inadvertent dozing.  She wakes about 1-2 times per night usually. She denies cataplexy or hypnagogue.  She still has the dogs in the room but they are not waking her. She has cut her caffeine down to 1-2 cups before noon.    Past medical/surgical history, family history, social history, medications and allergies were reviewed.      Problem List:  Patient Active Problem List    Diagnosis Date Noted     Major depressive disorder, recurrent episode, moderate (H) 11/19/2015     Priority: Medium     Anxiety 11/19/2015     Priority: Medium     CARDIOVASCULAR SCREENING; LDL GOAL LESS THAN 160 10/22/2015     Priority: Medium        Impression/Plan:  (G47.8) Sleep paralysis  (primary encounter diagnosis)  Comment: She has been working on getting more sleep and it has reduced her sleep paralysis from 4-6 times per week to 2-3 times per week. She is getting between 7 and 8 hours of sleep most nights. She still dozes  Per pt, she would like her result send to her Mychart, pls advise. if she relaxes in the evening, which could suggest she needs more sleep or that she could possibly have another sleep disorder such as apnea or narcolepsy. She denies cataplexy. She has been on an SSRI in the past and did not notice a difference in frequency of the events.   Plan: She was advised to continue to work on increasing her sleep intake. We talked about further evaluation of possible apnea or narcolepsy, but she would like to continue to work on getting more sleep. Diagnosing narcolepsy would not change how her sleep paralysis is treated though, as there really is no definitive treatment for sleep paralysis. We talked about trying an SSRI again, but she may be interested in starting a family in a year or two, so would like to minimize medication. She was advised to contact me if she ever develops cataplexy.    (R06.83) Snoring  Comment: She did not have new information about her snoring.  Plan: I recommended she get the SnEdaiab moisés on her phone and let me know if she observes any irregularities in her breathing. We reviewed how apnea could contribute to sleep paralysis by triggering arousals and making her sleep deprived. I offered oximetry and other evaluation for apnea, but she declined for now.    (R40.0) Sleepiness  Comment: She does not seem particularly bothered by sleepiness, but she dozes in the evening after dinner if sedentary and as a passenger in a car on long drives (which does not happen often).  Plan: We will do further workup if this does not improve with getting more sleep.      She will follow up with me in the future if her symptoms are not improving.     Twenty-five minutes spent with patient, all of which were spent face-to-face counseling, consulting, coordinating plan of care.      Bennett Goltz, PA-C    CC: No ref. provider found

## 2017-12-08 ENCOUNTER — TELEPHONE (OUTPATIENT)
Dept: OBGYN CLINIC | Facility: CLINIC | Age: 27
End: 2017-12-08

## 2017-12-09 NOTE — TELEPHONE ENCOUNTER
[phone] (home)   lmtcb  Dis patient have [place] with Gyne Onc as SJM advised in October?

## 2017-12-11 NOTE — TELEPHONE ENCOUNTER
Spoke to patient, informed patient she will need to schedule appt with [facility] for a colpo. Call transferred to [facility] KASIE to schedule.

## 2017-12-12 ENCOUNTER — OFFICE VISIT (OUTPATIENT)
Dept: OBGYN CLINIC | Facility: CLINIC | Age: 27
End: 2017-12-12

## 2017-12-12 VITALS
DIASTOLIC BLOOD PRESSURE: 72 MMHG | BODY MASS INDEX: 23.79 KG/M2 | HEIGHT: 61 IN | WEIGHT: 126 LBS | HEART RATE: 79 BPM | SYSTOLIC BLOOD PRESSURE: 122 MMHG

## 2017-12-12 DIAGNOSIS — R87.611 PAPANICOLAOU SMEAR OF CERVIX WITH ATYPICAL SQUAMOUS CELLS CANNOT EXCLUDE HIGH GRADE SQUAMOUS INTRAEPITHELIAL LESION (ASC-H): ICD-10-CM

## 2017-12-12 DIAGNOSIS — R87.611 PAP SMEAR OF CERVIX WITH ASCUS, CANNOT EXCLUDE HGSIL: ICD-10-CM

## 2017-12-12 DIAGNOSIS — Z01.812 PRE-PROCEDURAL LABORATORY EXAMINATION: Primary | ICD-10-CM

## 2017-12-12 PROCEDURE — 57454 BX/CURETT OF CERVIX W/SCOPE: CPT | Performed by: OBSTETRICS & GYNECOLOGY

## 2017-12-12 PROCEDURE — 81025 URINE PREGNANCY TEST: CPT | Performed by: OBSTETRICS & GYNECOLOGY

## 2017-12-12 NOTE — PROCEDURES
COLPOSCOPY:   Age: 32year old  LMP: No LMP recorded. Patient is not currently having periods (Reason: IUD - Intrauterine Device).     Pregnant: No  Contraception: IUD  Smoker: No    Last Pap smear: ASCUS, cannot R/O HGSIL,  Patient did not seek colpos

## 2017-12-16 ENCOUNTER — TELEPHONE (OUTPATIENT)
Dept: OBGYN CLINIC | Facility: CLINIC | Age: 27
End: 2017-12-16

## 2017-12-16 NOTE — TELEPHONE ENCOUNTER
----- Message from Edmund Sanchez MD sent at 12/15/2017  2:15 PM CST -----  Colpo biopsies show ELLEN 1. I want the patient to repeat her pap in 3 months. Call patient.

## 2017-12-16 NOTE — TELEPHONE ENCOUNTER
Left message to call office. Patient placed in recall log book for pap in 3 months per Dr. Carley Burnett recommendations.

## 2017-12-21 ENCOUNTER — OFFICE VISIT (OUTPATIENT)
Dept: FAMILY MEDICINE CLINIC | Facility: CLINIC | Age: 27
End: 2017-12-21

## 2017-12-21 VITALS
SYSTOLIC BLOOD PRESSURE: 102 MMHG | TEMPERATURE: 98 F | BODY MASS INDEX: 23.79 KG/M2 | HEART RATE: 70 BPM | DIASTOLIC BLOOD PRESSURE: 65 MMHG | WEIGHT: 126 LBS | HEIGHT: 61 IN

## 2017-12-21 DIAGNOSIS — R51.9 HEADACHE, UNSPECIFIED HEADACHE TYPE: ICD-10-CM

## 2017-12-21 DIAGNOSIS — J30.89 OTHER ALLERGIC RHINITIS: ICD-10-CM

## 2017-12-21 DIAGNOSIS — F45.8 BRUXISM (TEETH GRINDING): ICD-10-CM

## 2017-12-21 DIAGNOSIS — G44.209 TENSION HEADACHE: Primary | ICD-10-CM

## 2017-12-21 DIAGNOSIS — M26.609 TMJ DYSFUNCTION: ICD-10-CM

## 2017-12-21 PROCEDURE — 99212 OFFICE O/P EST SF 10 MIN: CPT | Performed by: FAMILY MEDICINE

## 2017-12-21 PROCEDURE — 99214 OFFICE O/P EST MOD 30 MIN: CPT | Performed by: FAMILY MEDICINE

## 2017-12-21 RX ORDER — DEXAMETHASONE 4 MG/1
4 TABLET ORAL
Qty: 5 TABLET | Refills: 0 | Status: SHIPPED | OUTPATIENT
Start: 2017-12-21 | End: 2017-12-26

## 2017-12-21 RX ORDER — MONTELUKAST SODIUM 10 MG/1
10 TABLET ORAL NIGHTLY
Qty: 30 TABLET | Refills: 3 | Status: SHIPPED | OUTPATIENT
Start: 2017-12-21 | End: 2018-06-05

## 2017-12-21 RX ORDER — CYCLOBENZAPRINE HCL 10 MG
10 TABLET ORAL NIGHTLY
Qty: 20 TABLET | Refills: 0 | Status: SHIPPED | OUTPATIENT
Start: 2017-12-21 | End: 2018-01-15

## 2017-12-21 NOTE — PATIENT INSTRUCTIONS
You have bilateral TMJ dysfunction (temporomandibular joint). Do warm compresses 3 times daily for 5 minutes each time and take medicine as directed. Avoid chewing tough meats, bagels, and gum excessively.   May need to see dentist for night mouthguar

## 2017-12-21 NOTE — PROGRESS NOTES
Patient ID: Nohelia Lynn is a 32year old female. HPI  Patient presents with:  URI  She states for 2 weeks now she has had a forehead pain bilaterally. Her neck feels slightly stiff. She has taken Tylenol and Advil without much relief.   She does no Disp: 1 Bottle Rfl: 6     Allergies:  Codeine                    PHYSICAL EXAM:   Physical Exam  Blood pressure 102/65, pulse 70, temperature 98.2 °F (36.8 °C), temperature source Oral, height 5' 1\" (1.549 m), weight 126 lb (57.2 kg), last menstrual perio infection. Start her on Decadron in the morning and cyclobenzaprine at night. Headache, unspecified headache type  -     dexamethasone (DECADRON) 4 MG tablet;  Take 1 tablet (4 mg total) by mouth daily with breakfast.  -     Cyclobenzaprine HCl 10 MG Oral member understands and agrees to plan.          Alexandria Heredia, DO  12/21/2017

## 2018-01-15 DIAGNOSIS — G44.209 TENSION HEADACHE: ICD-10-CM

## 2018-01-15 DIAGNOSIS — F45.8 BRUXISM (TEETH GRINDING): ICD-10-CM

## 2018-01-15 DIAGNOSIS — R51.9 HEADACHE, UNSPECIFIED HEADACHE TYPE: ICD-10-CM

## 2018-01-15 DIAGNOSIS — M26.609 TMJ DYSFUNCTION: ICD-10-CM

## 2018-01-16 ENCOUNTER — NURSE TRIAGE (OUTPATIENT)
Dept: OTHER | Age: 28
End: 2018-01-16

## 2018-01-17 ENCOUNTER — OFFICE VISIT (OUTPATIENT)
Dept: FAMILY MEDICINE CLINIC | Facility: CLINIC | Age: 28
End: 2018-01-17

## 2018-01-17 VITALS
WEIGHT: 126 LBS | DIASTOLIC BLOOD PRESSURE: 69 MMHG | HEIGHT: 61 IN | TEMPERATURE: 98 F | SYSTOLIC BLOOD PRESSURE: 122 MMHG | BODY MASS INDEX: 23.79 KG/M2 | HEART RATE: 81 BPM

## 2018-01-17 DIAGNOSIS — J06.9 VIRAL UPPER RESPIRATORY TRACT INFECTION: Primary | ICD-10-CM

## 2018-01-17 PROCEDURE — 99213 OFFICE O/P EST LOW 20 MIN: CPT | Performed by: NURSE PRACTITIONER

## 2018-01-17 NOTE — PROGRESS NOTES
HPI  Pt here for cough, congestion, ear pain, sinus pain and sore throat and body aches for past 2 days. Denies fever. Has been travelling -was in [facility], went to [facility] and then home. Got flu shot.      Review of Systems   Constitutional: Negative 10 MG Oral Tab Take 1 tablet (10 mg total) by mouth nightly. Disp: 30 tablet Rfl: 3   ValACYclovir HCl 1 G Oral Tab TAKE TWO TABLETS BY MOUTH EVERY TWELVE HOURS Disp: 20 tablet Rfl: 0   Levonorgestrel 13.5 MG Intrauterine IUD by Intrauterine route.  Disp:

## 2018-01-17 NOTE — TELEPHONE ENCOUNTER
Please advise on refill request.    Refill Protocol Appointment Criteria  · Appointment scheduled in the past 6 months or in the next 3 months  Recent Outpatient Visits            3 weeks ago Tension headache    [facility], [facility], [address], [address]

## 2018-01-17 NOTE — TELEPHONE ENCOUNTER
Action Requested: Summary for Provider     []  Critical Lab, Recommendations Needed  [] Need Additional Advice  []   FYI    []   Need Orders  [] Need Medications Sent to Pharmacy  []  Other     SUMMARY: Appointment made with Hubert Trujillo 1/17/18 at [phone]

## 2018-01-18 RX ORDER — CYCLOBENZAPRINE HCL 10 MG
10 TABLET ORAL NIGHTLY
Qty: 20 TABLET | Refills: 0 | Status: SHIPPED | OUTPATIENT
Start: 2018-01-18 | End: 2018-02-07

## 2018-01-27 ENCOUNTER — OFFICE VISIT (OUTPATIENT)
Dept: FAMILY MEDICINE CLINIC | Facility: CLINIC | Age: 28
End: 2018-01-27

## 2018-01-27 VITALS
TEMPERATURE: 98 F | WEIGHT: 125 LBS | SYSTOLIC BLOOD PRESSURE: 115 MMHG | BODY MASS INDEX: 23.6 KG/M2 | DIASTOLIC BLOOD PRESSURE: 69 MMHG | HEART RATE: 76 BPM | HEIGHT: 61 IN

## 2018-01-27 DIAGNOSIS — H92.02 OTALGIA, LEFT: ICD-10-CM

## 2018-01-27 DIAGNOSIS — J32.9 SINUSITIS, UNSPECIFIED CHRONICITY, UNSPECIFIED LOCATION: Primary | ICD-10-CM

## 2018-01-27 DIAGNOSIS — H65.92 FLUID LEVEL BEHIND TYMPANIC MEMBRANE OF LEFT EAR: ICD-10-CM

## 2018-01-27 PROCEDURE — 99214 OFFICE O/P EST MOD 30 MIN: CPT | Performed by: FAMILY MEDICINE

## 2018-01-27 PROCEDURE — 99212 OFFICE O/P EST SF 10 MIN: CPT | Performed by: FAMILY MEDICINE

## 2018-01-27 RX ORDER — AMOXICILLIN AND CLAVULANATE POTASSIUM 875; 125 MG/1; MG/1
1 TABLET, FILM COATED ORAL 2 TIMES DAILY
Qty: 20 TABLET | Refills: 0 | Status: SHIPPED | OUTPATIENT
Start: 2018-01-27 | End: 2018-02-06

## 2018-01-27 RX ORDER — DEXAMETHASONE 4 MG/1
4 TABLET ORAL
Qty: 5 TABLET | Refills: 0 | Status: SHIPPED | OUTPATIENT
Start: 2018-01-27 | End: 2018-02-01

## 2018-01-27 NOTE — PROGRESS NOTES
Patient ID: Vern Holloway is a 32year old female. HPI  Patient presents with:   Follow - Up: f/u cough, ear pain, headache, sore throat    Action Requested: Summary for Provider     []  Critical Lab, Recommendations Needed  [] Need Additional Advice 108/67  09/15/17 : 102/66        Review of Systems   Constitutional: Positive for appetite change. Negative for chills and fever.    HENT: Positive for congestion, hearing loss (LT ear 2 days ago. ), postnasal drip, rhinorrhea, sinus pain and sinus pressure normal  Eyes: Conjunctivae are normal.   Neck: Normal range of motion. Neck supple. No thyromegaly present. Cardiovascular: Normal rate, regular rhythm and normal heart sounds. Lymphadenopathy:     Patient has no  cervical adenopathy.     Pulmonary/Ches

## 2018-01-29 ENCOUNTER — OFFICE VISIT (OUTPATIENT)
Dept: DERMATOLOGY CLINIC | Facility: CLINIC | Age: 28
End: 2018-01-29

## 2018-01-29 DIAGNOSIS — D22.9 MULTIPLE NEVI: Primary | ICD-10-CM

## 2018-01-29 DIAGNOSIS — L30.8 PSORIASIFORM DERMATITIS: ICD-10-CM

## 2018-01-29 DIAGNOSIS — D23.4 BENIGN NEOPLASM OF SCALP AND SKIN OF NECK: ICD-10-CM

## 2018-01-29 DIAGNOSIS — D23.9 BENIGN NEOPLASM OF SKIN, UNSPECIFIED LOCATION: ICD-10-CM

## 2018-01-29 DIAGNOSIS — L70.0 ACNE VULGARIS: ICD-10-CM

## 2018-01-29 PROCEDURE — 99212 OFFICE O/P EST SF 10 MIN: CPT | Performed by: DERMATOLOGY

## 2018-01-29 PROCEDURE — 99203 OFFICE O/P NEW LOW 30 MIN: CPT | Performed by: DERMATOLOGY

## 2018-01-29 RX ORDER — BETAMETHASONE DIPROPIONATE 0.5 MG/G
LOTION TOPICAL
Qty: 60 ML | Refills: 3 | Status: SHIPPED | OUTPATIENT
Start: 2018-01-29

## 2018-01-29 RX ORDER — SPIRONOLACTONE 25 MG/1
TABLET ORAL
Qty: 15 TABLET | Refills: 3 | Status: SHIPPED | OUTPATIENT
Start: 2018-01-29 | End: 2018-04-26

## 2018-01-29 RX ORDER — ADAPALENE 45 G/G
GEL TOPICAL
Qty: 15 G | Refills: 2 | Status: SHIPPED | OUTPATIENT
Start: 2018-01-29

## 2018-01-29 RX ORDER — CLINDAMYCIN PHOSPHATE 11.9 MG/ML
1 SOLUTION TOPICAL 2 TIMES DAILY
Qty: 60 ML | Refills: 12 | Status: SHIPPED | OUTPATIENT
Start: 2018-01-29 | End: 2018-02-28

## 2018-01-31 ENCOUNTER — TELEPHONE (OUTPATIENT)
Dept: OTHER | Age: 28
End: 2018-01-31

## 2018-01-31 DIAGNOSIS — J34.89 SINUS PRESSURE: Primary | ICD-10-CM

## 2018-01-31 NOTE — TELEPHONE ENCOUNTER
Pt stts still has sinus pressure,ears haven't popped and sore throat. Still on antibiotic. Denies fever. Pt wondering what else she can try. Going on 1 1/2 weeks.   Please reply to paola: CM Viramontes

## 2018-01-31 NOTE — TELEPHONE ENCOUNTER
Pt informed of Dr Sharlet Boxer order and agreed to go to ENT. Ears are bothersome with a lot pressure. Dr Sharlet Boxer the referral is pended for dx code.

## 2018-02-01 NOTE — TELEPHONE ENCOUNTER
Called and advised patient that referral has been made by  Dr Reyes Diaz yesterday, number given to call [phone]. Patient requesting copy of Referral to be send through [address] ,

## 2018-02-03 ENCOUNTER — OFFICE VISIT (OUTPATIENT)
Dept: AUDIOLOGY | Facility: CLINIC | Age: 28
End: 2018-02-03

## 2018-02-03 ENCOUNTER — OFFICE VISIT (OUTPATIENT)
Dept: OTOLARYNGOLOGY | Facility: CLINIC | Age: 28
End: 2018-02-03

## 2018-02-03 ENCOUNTER — LAB ENCOUNTER (OUTPATIENT)
Dept: LAB | Facility: HOSPITAL | Age: 28
End: 2018-02-03
Attending: OTOLARYNGOLOGY
Payer: MEDICAID

## 2018-02-03 VITALS — WEIGHT: 125 LBS | HEIGHT: 61 IN | TEMPERATURE: 98 F | BODY MASS INDEX: 23.6 KG/M2

## 2018-02-03 DIAGNOSIS — H69.83 DYSFUNCTION OF BOTH EUSTACHIAN TUBES: ICD-10-CM

## 2018-02-03 DIAGNOSIS — R11.14 BILIOUS VOMITING WITHOUT NAUSEA: ICD-10-CM

## 2018-02-03 DIAGNOSIS — H69.83 DYSFUNCTION OF BOTH EUSTACHIAN TUBES: Primary | ICD-10-CM

## 2018-02-03 DIAGNOSIS — R63.4 ABNORMAL WEIGHT LOSS: ICD-10-CM

## 2018-02-03 DIAGNOSIS — H69.93 EUSTACHIAN TUBE DISORDER, BILATERAL: Primary | ICD-10-CM

## 2018-02-03 LAB — CORTIS SERPL-MCNC: 10.9 MCG/DL

## 2018-02-03 PROCEDURE — 82533 TOTAL CORTISOL: CPT

## 2018-02-03 PROCEDURE — 99212 OFFICE O/P EST SF 10 MIN: CPT | Performed by: OTOLARYNGOLOGY

## 2018-02-03 PROCEDURE — 86003 ALLG SPEC IGE CRUDE XTRC EA: CPT

## 2018-02-03 PROCEDURE — 82785 ASSAY OF IGE: CPT

## 2018-02-03 PROCEDURE — 99244 OFF/OP CNSLTJ NEW/EST MOD 40: CPT | Performed by: OTOLARYNGOLOGY

## 2018-02-03 PROCEDURE — 36415 COLL VENOUS BLD VENIPUNCTURE: CPT

## 2018-02-03 PROCEDURE — 92567 TYMPANOMETRY: CPT | Performed by: AUDIOLOGIST

## 2018-02-03 PROCEDURE — 92557 COMPREHENSIVE HEARING TEST: CPT | Performed by: AUDIOLOGIST

## 2018-02-03 NOTE — PROGRESS NOTES
AUDIOGRAM     Jessica Geronimo was referred for testing by London Maria. 5/19/1990  [id number]    History: Clogged ears. Otoscopic Inspection:  both ears: no cerumen    Audiometric Test Results: The patient was tested using air only audiometry.   The au

## 2018-02-03 NOTE — PROGRESS NOTES
Genie Molina is a 32year old female.   Patient presents with:  Sinus Problem: Ear pressure, congestion, taking antibiotic and steroid for 14 days not helping      HISTORY OF PRESENT ILLNESS  2/3/2018  Patient prevents for evaluation of chronic feeling li mammoplasty      REVIEW OF SYSTEMS    System Neg/Pos Details   Constitutional Negative Fatigue, fever and weight loss. ENMT Negative Drooling. Eyes Negative Blurred vision and vision changes. Respiratory Negative Dyspnea and wheezing.    Cardio [employer] Current Outpatient Prescriptions:   •  Betamethasone Dipropionate 0.05 % External Lotion, Use bid as directed, Disp: 60 mL, Rfl: 3  •  Clindamycin Phosphate 1 % External Solution, Apply 1 Application topically 2 (two) times daily.  Apply to the affect to these episodes I reccomend starting the allergy medication prior to the season. Patient was instructed to call if symptoms do not resolve.         Marjorie Dennison MD

## 2018-02-05 ENCOUNTER — TELEPHONE (OUTPATIENT)
Dept: OTOLARYNGOLOGY | Facility: CLINIC | Age: 28
End: 2018-02-05

## 2018-02-05 NOTE — TELEPHONE ENCOUNTER
Called Jessica to inform her that the order of CT scan has been approved. Spoke to rep Philip Sanchez # [id number], valid 2/5/18-3/22/18.  I did inform Jessica that the authorization number is not a guarantee of payment and she [place] [facility] [place]

## 2018-02-06 LAB
A ALTERNATA IGE QN: <0.1 KUA/L (ref ?–0.1)
A FUMIGATUS IGE QN: <0.1 KUA/L (ref ?–0.1)
AMER SYCAMORE IGE QN: <0.1 KUA/L (ref ?–0.1)
BERMUDA GRASS IGE QN: <0.1 KUA/L (ref ?–0.1)
BOXELDER IGE QN: <0.1 KUA/L (ref ?–0.1)
C HERBARUM IGE QN: <0.1 KUA/L (ref ?–0.1)
CALIF WALNUT IGE QN: <0.1 KUA/L (ref ?–0.1)
CAT DANDER IGE QN: <0.1 KUA/L (ref ?–0.1)
CLAM IGE QN: <0.1 KUA/L (ref ?–0.1)
CMN PIGWEED IGE QN: <0.1 KUA/L (ref ?–0.1)
CODFISH IGE QN: <0.1 KUA/L (ref ?–0.1)
COMMON RAGWEED IGE QN: 2.74 KUA/L (ref ?–0.1)
CORN IGE QN: <0.1 KUA/L (ref ?–0.1)
COTTONWOOD IGE QN: <0.1 KUA/L (ref ?–0.1)
COW MILK IGE QN: <0.1 KUA/L (ref ?–0.1)
D FARINAE IGE QN: <0.1 KUA/L (ref ?–0.1)
D PTERONYSS IGE QN: <0.1 KUA/L (ref ?–0.1)
DOG DANDER IGE QN: <0.1 KUA/L (ref ?–0.1)
EGG WHITE IGE QN: <0.1 KUA/L (ref ?–0.1)
GOOSEFOOT IGE QN: <0.1 KUA/L (ref ?–0.1)
HOUSE DUST HS IGE QN: <0.1 KUA/L (ref ?–0.1)
IGE SERPL-ACNC: 57.6 KU/L (ref 2–214)
IGE SERPL-ACNC: 57.6 KU/L (ref 2–214)
KENT BLUE GRASS IGE QN: <0.1 KUA/L (ref ?–0.1)
M RACEMOSUS IGE QN: <0.1 KUA/L (ref ?–0.1)
MARSH ELDER IGE QN: 0.58 KUA/L (ref ?–0.1)
MOUSE EPITH IGE QN: <0.1 KUA/L (ref ?–0.1)
MT JUNIPER IGE QN: <0.1 KUA/L (ref ?–0.1)
P NOTATUM IGE QN: <0.1 KUA/L (ref ?–0.1)
PEANUT IGE QN: <0.1 KUA/L (ref ?–0.1)
PECAN/HICK TREE IGE QN: <0.1 KUA/L (ref ?–0.1)
PER RYE GRASS IGE QN: <0.1 KUA/L (ref ?–0.1)
ROACH IGE QN: <0.1 KUA/L (ref ?–0.1)
SALTWORT IGE QN: <0.1 KUA/L (ref ?–0.1)
SCALLOP IGE QN: <0.1 KUA/L (ref ?–0.1)
SESAME SEED IGE QN: <0.1 KUA/L (ref ?–0.1)
SHRIMP IGE QN: <0.1 KUA/L (ref ?–0.1)
SOYBEAN IGE QN: <0.1 KUA/L (ref ?–0.1)
TIMOTHY IGE QN: <0.1 KUA/L (ref ?–0.1)
WALNUT IGE QN: <0.1 KUA/L (ref ?–0.1)
WHEAT IGE QN: <0.1 KUA/L (ref ?–0.1)
WHITE ASH IGE QN: <0.1 KUA/L (ref ?–0.1)
WHITE ELM IGE QN: <0.1 KUA/L (ref ?–0.1)
WHITE MULBERRY IGE QN: <0.1 KUA/L (ref ?–0.1)
WHITE OAK IGE QN: <0.1 KUA/L (ref ?–0.1)

## 2018-02-10 ENCOUNTER — HOSPITAL ENCOUNTER (OUTPATIENT)
Dept: CT IMAGING | Facility: HOSPITAL | Age: 28
Discharge: HOME | End: 2018-02-10
Attending: OTOLARYNGOLOGY
Payer: MEDICAID

## 2018-02-10 DIAGNOSIS — H69.83 DYSFUNCTION OF BOTH EUSTACHIAN TUBES: ICD-10-CM

## 2018-02-10 PROCEDURE — 70486 CT MAXILLOFACIAL W/O DYE: CPT | Performed by: OTOLARYNGOLOGY

## 2018-02-11 NOTE — PROGRESS NOTES
Adama Melo is a 32year old female. Patient presents with:  Derm Problem: \"New pt\"- Pt presenting today with itchy dry scabs located on scalp x 1 year pt notes used Tgel with no improv.    Lesion: Pt concerned with raised lesion to left temple pt Take 1 tablet (10 mg total) by mouth nightly. Disp: 30 tablet Rfl: 3   Levonorgestrel 13.5 MG Intrauterine IUD by Intrauterine route.  Disp:  Rfl:      Allergies:     Codeine                     Past Medical History:   Diagnosis Date   • Anemia    • Decorat including scalp, head, neck, face,nails, hair, external eyes, including conjunctival mucosa, eyelids, lips, external ears, back, chest, abdomen, arms, legs, palms.   Remarkable for lesions as noted     8 mm tan papule left temple uniform color borders no ap regarding benign skin lesions. Signs and symptoms of skin cancer, ABCDE's of melanoma briefly reviewed. Sunscreen use, sun protection, encouraged. Followup as noted in rtc or p.r.n. No orders of the defined types were placed in this encounter.       Med

## 2018-02-13 ENCOUNTER — TELEPHONE (OUTPATIENT)
Dept: OTOLARYNGOLOGY | Facility: CLINIC | Age: 28
End: 2018-02-13

## 2018-02-13 ENCOUNTER — OFFICE VISIT (OUTPATIENT)
Dept: ALLERGY | Facility: CLINIC | Age: 28
End: 2018-02-13

## 2018-02-13 ENCOUNTER — NURSE ONLY (OUTPATIENT)
Dept: ALLERGY | Facility: CLINIC | Age: 28
End: 2018-02-13

## 2018-02-13 VITALS
HEART RATE: 78 BPM | HEIGHT: 61 IN | RESPIRATION RATE: 18 BRPM | WEIGHT: 126.19 LBS | TEMPERATURE: 99 F | SYSTOLIC BLOOD PRESSURE: 118 MMHG | DIASTOLIC BLOOD PRESSURE: 74 MMHG | BODY MASS INDEX: 23.83 KG/M2

## 2018-02-13 DIAGNOSIS — J30.1 SEASONAL ALLERGIC RHINITIS DUE TO POLLEN: Primary | ICD-10-CM

## 2018-02-13 DIAGNOSIS — M26.69 TMJ INFLAMMATION: Primary | ICD-10-CM

## 2018-02-13 DIAGNOSIS — J30.89 ALLERGIC RHINITIS DUE TO OTHER ALLERGIC TRIGGER, UNSPECIFIED SEASONALITY: ICD-10-CM

## 2018-02-13 DIAGNOSIS — Z91.09 ENVIRONMENTAL ALLERGIES: ICD-10-CM

## 2018-02-13 PROCEDURE — 99212 OFFICE O/P EST SF 10 MIN: CPT | Performed by: ALLERGY & IMMUNOLOGY

## 2018-02-13 PROCEDURE — 95004 PERQ TESTS W/ALRGNC XTRCS: CPT | Performed by: ALLERGY & IMMUNOLOGY

## 2018-02-13 PROCEDURE — 99244 OFF/OP CNSLTJ NEW/EST MOD 40: CPT | Performed by: ALLERGY & IMMUNOLOGY

## 2018-02-13 RX ORDER — LEVOCETIRIZINE DIHYDROCHLORIDE 5 MG/1
5 TABLET, FILM COATED ORAL NIGHTLY
Qty: 30 TABLET | Refills: 0 | Status: SHIPPED | OUTPATIENT
Start: 2018-02-13 | End: 2018-06-05

## 2018-02-13 NOTE — PATIENT INSTRUCTIONS
Recs: handouts on allergies and avoidance measures provided and reviewed  Continue with Singulair and Flonase  Recommended trial of Xyzal levocetirizine 5 mg once a night at bedtime  Recommend trial of sinus rinses  Patient has tried antibiotics and predni

## 2018-02-13 NOTE — PROGRESS NOTES
Adonis Ferris is a 32year old female. HPI:   Patient presents with:   Allergies: positive ragweed, weed serum testing   Sinus Problem: bloody nose every morning, daily nasal congestion, migraines     Patient is a 15-year-old female who presents for all bowel syndrome (IBS)    • Lipid screening 9/17/2009    per [facility]      Past Surgical History:  2008: BREAST SURGERY      Comment: Augmentation mammoplasty   Family History   Problem Relation Age of Onset   • Diabetes Father    • Hypertension Mother    • Kidney symptoms  Neurological:  Negative for dizziness, seizures  Psychiatric:  Negative for inappropriate interaction and psychiatric symptoms  Respiratory:  Negative for cough, dyspnea and wheezing      PHYSICAL EXAM:   Constitutional: responsive, no acute dist to medication administration and dosing and potential side effects.  Teaching was provided via the teach back method

## 2018-03-07 DIAGNOSIS — B00.1 HERPES LABIALIS: ICD-10-CM

## 2018-03-07 NOTE — TELEPHONE ENCOUNTER
Pt is requesting refill on meds listed below. . pharmacy can't refill say script is old.           ValACYclovir HCl 1 G Oral Tab (Discontinued) 20 tablet 0 8/28/2017 1/27/2018   Sig :  TAKE TWO TABLETS BY MOUTH EVERY TWELVE HOURS

## 2018-03-08 RX ORDER — VALACYCLOVIR HYDROCHLORIDE 1 G/1
TABLET, FILM COATED ORAL
Qty: 20 TABLET | Refills: 0 | Status: SHIPPED | OUTPATIENT
Start: 2018-03-08 | End: 2018-07-26

## 2018-03-08 NOTE — TELEPHONE ENCOUNTER
Please advise regarding pended refill request as does not fall under a protocol.      Last eRx= 9/28/17 #20      Recent Outpatient Visits            3 weeks ago Environmental allergies    [facility], [facility], [address] Only    3 weeks a

## 2018-03-10 NOTE — TELEPHONE ENCOUNTER
Please advise regarding refill request.     Refill Protocol Appointment Criteria  · Appointment scheduled in the past 12 months or in the next 3 months  Recent Visits       Provider Department Primary Dx    1 month ago Jerel Regan, [facility], [facility] I have personally seen and examined this patient.  I have fully participated in the care of this patient. I have reviewed all pertinent clinical information, including history, physical exam, plan and the Resident’s note and agree except as noted.

## 2018-03-22 ENCOUNTER — OFFICE VISIT (OUTPATIENT)
Dept: OBGYN CLINIC | Facility: CLINIC | Age: 28
End: 2018-03-22

## 2018-03-22 VITALS — SYSTOLIC BLOOD PRESSURE: 104 MMHG | DIASTOLIC BLOOD PRESSURE: 62 MMHG | BODY MASS INDEX: 24 KG/M2 | WEIGHT: 125 LBS

## 2018-03-22 DIAGNOSIS — R87.810 ATYPICAL SQUAMOUS CELL CHANGES OF UNDETERMINED SIGNIFICANCE (ASCUS) ON CERVICAL CYTOLOGY WITH POSITIVE HIGH RISK HUMAN PAPILLOMA VIRUS (HPV): Primary | ICD-10-CM

## 2018-03-22 DIAGNOSIS — R87.610 ATYPICAL SQUAMOUS CELL CHANGES OF UNDETERMINED SIGNIFICANCE (ASCUS) ON CERVICAL CYTOLOGY WITH POSITIVE HIGH RISK HUMAN PAPILLOMA VIRUS (HPV): Primary | ICD-10-CM

## 2018-03-22 PROCEDURE — 99213 OFFICE O/P EST LOW 20 MIN: CPT | Performed by: OBSTETRICS & GYNECOLOGY

## 2018-03-22 NOTE — PROGRESS NOTES
HPI:    Patient ID: Xavier Hinton is a 32year old female. HPI  Patient here for repeat pap. H/O ASCUS , cannot R/O HGSIL in 9/2017. S/P Colposcopy with biopsies in 12/2017 that showed ELLEN 1. Has Nicky IUD since 9/2015.   Needs to be replaced in Sept risk human papilloma virus (hpv)  (primary encounter diagnosis)  F/U Pap results. All questions answered.       Orders Placed This Encounter      Hpv Dna  High Risk , Thin Prep Collect      ThinPrep PAP Smear    Meds This Visit:  No prescriptions requested

## 2018-03-23 LAB — HPV I/H RISK 1 DNA SPEC QL NAA+PROBE: POSITIVE

## 2018-03-26 ENCOUNTER — TELEPHONE (OUTPATIENT)
Dept: OBGYN CLINIC | Facility: CLINIC | Age: 28
End: 2018-03-26

## 2018-03-26 NOTE — TELEPHONE ENCOUNTER
----- Message from Margarita Zhang MD sent at 3/26/2018  2:41 PM CDT -----  Pap shows ASCUS with + HPV. Patient already had colposcopy last year. Patient to have repeat pap in 6 months. Notify patient.

## 2018-04-26 ENCOUNTER — HOSPITAL ENCOUNTER (OUTPATIENT)
Age: 28
Discharge: HOME | End: 2018-04-26
Attending: EMERGENCY MEDICINE
Payer: MEDICAID

## 2018-04-26 VITALS
HEART RATE: 69 BPM | OXYGEN SATURATION: 99 % | TEMPERATURE: 99 F | DIASTOLIC BLOOD PRESSURE: 70 MMHG | RESPIRATION RATE: 18 BRPM | SYSTOLIC BLOOD PRESSURE: 124 MMHG

## 2018-04-26 DIAGNOSIS — S61.215A LACERATION OF LEFT RING FINGER WITHOUT FOREIGN BODY WITHOUT DAMAGE TO NAIL, INITIAL ENCOUNTER: Primary | ICD-10-CM

## 2018-04-26 PROCEDURE — 12001 RPR S/N/AX/GEN/TRNK 2.5CM/<: CPT

## 2018-04-26 PROCEDURE — 99203 OFFICE O/P NEW LOW 30 MIN: CPT

## 2018-04-26 PROCEDURE — 99202 OFFICE O/P NEW SF 15 MIN: CPT

## 2018-04-26 RX ORDER — GINSENG 100 MG
CAPSULE ORAL ONCE
Status: COMPLETED | OUTPATIENT
Start: 2018-04-26 | End: 2018-04-26

## 2018-04-26 NOTE — ED PROVIDER NOTES
Patient Seen in: [facility] In [address] [place] [address]    History   Patient presents with:  Laceration Abrasion (integumentary)    Stated Complaint: laceration    HPI    Patient is 41-year-old female who states that she cut her finger with a sciss Verbal consent was obtained from the patient. The 1.5 cm laceration located left fourth finger was anesthetized in the usual fashion. The wound was scrubbed, draped and explored. There were no deep structures involved. No tendon injury was identified.

## 2018-05-07 ENCOUNTER — TELEPHONE (OUTPATIENT)
Dept: OTHER | Age: 28
End: 2018-05-07

## 2018-05-07 ENCOUNTER — OFFICE VISIT (OUTPATIENT)
Dept: FAMILY MEDICINE CLINIC | Facility: CLINIC | Age: 28
End: 2018-05-07

## 2018-05-07 VITALS
HEIGHT: 61 IN | SYSTOLIC BLOOD PRESSURE: 94 MMHG | WEIGHT: 125 LBS | BODY MASS INDEX: 23.6 KG/M2 | HEART RATE: 60 BPM | DIASTOLIC BLOOD PRESSURE: 56 MMHG | TEMPERATURE: 99 F

## 2018-05-07 DIAGNOSIS — S61.205D OPEN WOUND OF LEFT RING FINGER WITHOUT DAMAGE TO NAIL, SUBSEQUENT ENCOUNTER: Primary | ICD-10-CM

## 2018-05-07 PROCEDURE — 99213 OFFICE O/P EST LOW 20 MIN: CPT | Performed by: FAMILY MEDICINE

## 2018-05-07 PROCEDURE — 99212 OFFICE O/P EST SF 10 MIN: CPT | Performed by: FAMILY MEDICINE

## 2018-05-07 NOTE — TELEPHONE ENCOUNTER
Pt states she needs stitches removed from left ring finger today, stitches were placed 4/26/18. Appt scheduled 2 PM today.

## 2018-05-07 NOTE — PROGRESS NOTES
Patient ID: Adama Melo is a 32year old female.     HPI  Patient presents with:  Suture Removal    On 4/26/2018 she went to the immediate care due to cutting her left fourth finger with scissors just prior to arrival.  There is a 1.5 cm laceration and nightly. Disp: 30 tablet Rfl: 3   Levonorgestrel 13.5 MG Intrauterine IUD by Intrauterine route.  Disp:  Rfl:      Allergies:  Codeine                    PHYSICAL EXAM:   Physical Exam  Blood pressure 94/56, pulse 60, temperature 99.1 °F (37.3 °C), temperat

## 2018-06-05 ENCOUNTER — NURSE TRIAGE (OUTPATIENT)
Dept: OTHER | Age: 28
End: 2018-06-05

## 2018-06-05 ENCOUNTER — OFFICE VISIT (OUTPATIENT)
Dept: FAMILY MEDICINE CLINIC | Facility: CLINIC | Age: 28
End: 2018-06-05

## 2018-06-05 VITALS
HEIGHT: 61 IN | TEMPERATURE: 98 F | SYSTOLIC BLOOD PRESSURE: 107 MMHG | BODY MASS INDEX: 22.66 KG/M2 | DIASTOLIC BLOOD PRESSURE: 65 MMHG | WEIGHT: 120 LBS | HEART RATE: 82 BPM

## 2018-06-05 DIAGNOSIS — R04.0 EPISTAXIS: ICD-10-CM

## 2018-06-05 DIAGNOSIS — H69.83 DYSFUNCTION OF BOTH EUSTACHIAN TUBES: ICD-10-CM

## 2018-06-05 DIAGNOSIS — J30.89 OTHER ALLERGIC RHINITIS: Primary | ICD-10-CM

## 2018-06-05 PROCEDURE — 99213 OFFICE O/P EST LOW 20 MIN: CPT | Performed by: FAMILY MEDICINE

## 2018-06-05 PROCEDURE — 99212 OFFICE O/P EST SF 10 MIN: CPT | Performed by: FAMILY MEDICINE

## 2018-06-05 RX ORDER — MONTELUKAST SODIUM 10 MG/1
10 TABLET ORAL NIGHTLY
Qty: 90 TABLET | Refills: 1 | Status: SHIPPED | OUTPATIENT
Start: 2018-06-05 | End: 2018-12-02

## 2018-06-05 RX ORDER — FLUTICASONE PROPIONATE 50 MCG
2 SPRAY, SUSPENSION (ML) NASAL DAILY
Qty: 3 BOTTLE | Refills: 1 | Status: SHIPPED | OUTPATIENT
Start: 2018-06-05 | End: 2018-10-03

## 2018-06-05 NOTE — TELEPHONE ENCOUNTER
Action Requested: Summary for Provider     []  Critical Lab, Recommendations Needed  [x] Need Additional Advice  []   FYI    []   Need Orders  [] Need Medications Sent to Pharmacy  []  Other     SUMMARY: requesting add on appt for today     Onset 2 days ag

## 2018-06-05 NOTE — PROGRESS NOTES
Patient ID: Ariane Meeks is a 25year old female.     HPI  Patient presents with:  Sinus Problem: ear ache and sinus pain    Action Requested: Summary for Provider     []  Critical Lab, Recommendations Needed  [x] Need Additional Advice  []   FYI    [] choking and shortness of breath. Allergic/Immunologic: Positive for environmental allergies.          Past Medical History:   Diagnosis Date   • Anemia    • Decorative tattoo    • Irritable bowel syndrome (IBS)    • Lipid screening 9/17/2009    per [facility] Pulmonary/Chest: Effort normal and breath sounds normal. No respiratory distress. Lymphadenopathy:     Has  no cervical adenopathy. Neurological: Is alert and oriented to person, place, and time. Skin: Skin is warm.    Psychiatric: has a normal mood

## 2018-06-05 NOTE — PATIENT INSTRUCTIONS
Get over-the-counter Allegra 180 mg but just get the generic which is fexofenadine 180 mg and take daily. It does not make you tired. Can take at the same time you take your Flonase nasal spray.

## 2018-07-12 ENCOUNTER — TELEPHONE (OUTPATIENT)
Dept: FAMILY MEDICINE CLINIC | Facility: CLINIC | Age: 28
End: 2018-07-12

## 2018-07-16 NOTE — TELEPHONE ENCOUNTER
Phone Room- Please call and schedule an appointment for patient with Dr. Ana Mccollum. See message below.

## 2018-07-16 NOTE — TELEPHONE ENCOUNTER
Let patient know that I did review the information she brought but it is requiring labs that were done within 1 year which she does not have plus and wants an eye exam which we did not do as we do not have this form when she came in for a physical in the p

## 2018-07-26 ENCOUNTER — LAB ENCOUNTER (OUTPATIENT)
Dept: LAB | Age: 28
End: 2018-07-26
Attending: FAMILY MEDICINE
Payer: MEDICAID

## 2018-07-26 ENCOUNTER — OFFICE VISIT (OUTPATIENT)
Dept: FAMILY MEDICINE CLINIC | Facility: CLINIC | Age: 28
End: 2018-07-26
Payer: MEDICAID

## 2018-07-26 VITALS
DIASTOLIC BLOOD PRESSURE: 59 MMHG | TEMPERATURE: 98 F | WEIGHT: 118 LBS | HEART RATE: 64 BPM | BODY MASS INDEX: 22.28 KG/M2 | SYSTOLIC BLOOD PRESSURE: 102 MMHG | HEIGHT: 61 IN

## 2018-07-26 DIAGNOSIS — Z00.00 ADULT GENERAL MEDICAL EXAM: ICD-10-CM

## 2018-07-26 DIAGNOSIS — B00.1 HERPES LABIALIS: ICD-10-CM

## 2018-07-26 DIAGNOSIS — Z00.00 ADULT GENERAL MEDICAL EXAM: Primary | ICD-10-CM

## 2018-07-26 DIAGNOSIS — J30.89 ALLERGIC RHINITIS DUE TO OTHER ALLERGIC TRIGGER, UNSPECIFIED SEASONALITY: ICD-10-CM

## 2018-07-26 LAB
ALBUMIN SERPL BCP-MCNC: 4.4 G/DL (ref 3.5–4.8)
ALBUMIN/GLOB SERPL: 1.6 {RATIO} (ref 1–2)
ALP SERPL-CCNC: 41 U/L (ref 32–100)
ALT SERPL-CCNC: 20 U/L (ref 14–54)
ANION GAP SERPL CALC-SCNC: 7 MMOL/L (ref 0–18)
AST SERPL-CCNC: 24 U/L (ref 15–41)
BASOPHILS # BLD: 0.1 K/UL (ref 0–0.2)
BASOPHILS NFR BLD: 1 %
BILIRUB SERPL-MCNC: 0.5 MG/DL (ref 0.3–1.2)
BILIRUB UR QL: NEGATIVE
BUN SERPL-MCNC: 15 MG/DL (ref 8–20)
BUN/CREAT SERPL: 19.5 (ref 10–20)
CALCIUM SERPL-MCNC: 9.4 MG/DL (ref 8.5–10.5)
CHLORIDE SERPL-SCNC: 105 MMOL/L (ref 95–110)
CHOLEST SERPL-MCNC: 186 MG/DL (ref 110–200)
CLARITY UR: CLEAR
CO2 SERPL-SCNC: 25 MMOL/L (ref 22–32)
COLOR UR: YELLOW
CREAT SERPL-MCNC: 0.77 MG/DL (ref 0.5–1.5)
EOSINOPHIL # BLD: 0.2 K/UL (ref 0–0.7)
EOSINOPHIL NFR BLD: 3 %
ERYTHROCYTE [DISTWIDTH] IN BLOOD BY AUTOMATED COUNT: 13 % (ref 11–15)
GLOBULIN PLAS-MCNC: 2.8 G/DL (ref 2.5–3.7)
GLUCOSE SERPL-MCNC: 91 MG/DL (ref 70–99)
GLUCOSE UR-MCNC: NEGATIVE MG/DL
HCT VFR BLD AUTO: 40.8 % (ref 35–48)
HDLC SERPL-MCNC: 60 MG/DL
HGB BLD-MCNC: 14 G/DL (ref 12–16)
KETONES UR-MCNC: NEGATIVE MG/DL
LDLC SERPL CALC-MCNC: 118 MG/DL (ref 0–99)
LEUKOCYTE ESTERASE UR QL STRIP.AUTO: NEGATIVE
LYMPHOCYTES # BLD: 2.1 K/UL (ref 1–4)
LYMPHOCYTES NFR BLD: 30 %
MCH RBC QN AUTO: 31 PG (ref 27–32)
MCHC RBC AUTO-ENTMCNC: 34.3 G/DL (ref 32–37)
MCV RBC AUTO: 90.4 FL (ref 80–100)
MONOCYTES # BLD: 0.6 K/UL (ref 0–1)
MONOCYTES NFR BLD: 9 %
NEUTROPHILS # BLD AUTO: 4 K/UL (ref 1.8–7.7)
NEUTROPHILS NFR BLD: 57 %
NITRITE UR QL STRIP.AUTO: NEGATIVE
NONHDLC SERPL-MCNC: 126 MG/DL
OSMOLALITY UR CALC.SUM OF ELEC: 284 MOSM/KG (ref 275–295)
PATIENT FASTING: YES
PH UR: 7 [PH] (ref 5–8)
PLATELET # BLD AUTO: 252 K/UL (ref 140–400)
PMV BLD AUTO: 9.5 FL (ref 7.4–10.3)
POTASSIUM SERPL-SCNC: 4.1 MMOL/L (ref 3.3–5.1)
PROT SERPL-MCNC: 7.2 G/DL (ref 5.9–8.4)
PROT UR-MCNC: NEGATIVE MG/DL
RBC # BLD AUTO: 4.52 M/UL (ref 3.7–5.4)
RBC #/AREA URNS AUTO: 1 /HPF
SODIUM SERPL-SCNC: 137 MMOL/L (ref 136–144)
SP GR UR STRIP: 1.01 (ref 1–1.03)
TRIGL SERPL-MCNC: 41 MG/DL (ref 1–149)
TSH SERPL-ACNC: 1.45 UIU/ML (ref 0.45–5.33)
UROBILINOGEN UR STRIP-ACNC: <2
VIT C UR-MCNC: NEGATIVE MG/DL
WBC # BLD AUTO: 6.9 K/UL (ref 4–11)
WBC #/AREA URNS AUTO: <1 /HPF

## 2018-07-26 PROCEDURE — 84443 ASSAY THYROID STIM HORMONE: CPT

## 2018-07-26 PROCEDURE — 81001 URINALYSIS AUTO W/SCOPE: CPT

## 2018-07-26 PROCEDURE — 85025 COMPLETE CBC W/AUTO DIFF WBC: CPT

## 2018-07-26 PROCEDURE — 80307 DRUG TEST PRSMV CHEM ANLYZR: CPT

## 2018-07-26 PROCEDURE — 36415 COLL VENOUS BLD VENIPUNCTURE: CPT

## 2018-07-26 PROCEDURE — 80061 LIPID PANEL: CPT

## 2018-07-26 PROCEDURE — 80053 COMPREHEN METABOLIC PANEL: CPT

## 2018-07-26 PROCEDURE — 99395 PREV VISIT EST AGE 18-39: CPT | Performed by: FAMILY MEDICINE

## 2018-07-26 RX ORDER — VALACYCLOVIR HYDROCHLORIDE 1 G/1
TABLET, FILM COATED ORAL
Qty: 20 TABLET | Refills: 1 | Status: SHIPPED | OUTPATIENT
Start: 2018-07-26

## 2018-07-26 NOTE — PROGRESS NOTES
Patient ID: Josue Funez is a 25year old female. 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 She had her titers done and is immune.   We did give her a tetanus shot in November 7, 2017 so I d 53 12/20/2016   AST 30 12/20/2016   ALT 32 12/20/2016   ALKPHOS 64 11/07/2014   BILT 0.5 12/20/2016   TP 7.1 12/20/2016   ALB 4.5 12/20/2016   GLOBULIN 2.6 12/20/2016   AGRATIO 1.4 11/07/2014    12/20/2016   K 4.4 12/20/2016    12/20/2016   CO2 TOTPSASCREEN    =======================================================    Wt Readings from Last 6 Encounters:  07/26/18 : 118 lb (53.5 kg)  06/05/18 : 120 lb (54.4 kg)  05/07/18 : 125 lb (56.7 kg)  03/22/18 : 125 lb (56.7 kg)  02/13/18 : 126 lb 3.2 oz (57 Years of education: N/A  Number of children: N/A     Occupational History  None on file     Social History Main Topics   Smoking status: Former Smoker     Smokeless tobacco: Never Used    Alcohol use Yes  0.0 oz/week     Comment: Occasional wine    Drug us adenopathy. Neurological: She is alert and oriented to person, place, and time . She has normal reflexes. No cranial nerve deficit. Skin: Skin is warm and dry. No rash noted.    Psychiatric: She has a normal mood and affect   Visual acuity was 20/20 eac

## 2018-08-06 ENCOUNTER — TELEPHONE (OUTPATIENT)
Dept: OBGYN CLINIC | Facility: CLINIC | Age: 28
End: 2018-08-06

## 2018-08-10 ENCOUNTER — OFFICE VISIT (OUTPATIENT)
Dept: OBGYN CLINIC | Facility: CLINIC | Age: 28
End: 2018-08-10
Payer: MEDICAID

## 2018-08-10 VITALS — SYSTOLIC BLOOD PRESSURE: 100 MMHG | DIASTOLIC BLOOD PRESSURE: 64 MMHG

## 2018-08-10 DIAGNOSIS — Z30.430 ENCOUNTER FOR IUD INSERTION: Primary | ICD-10-CM

## 2018-08-10 DIAGNOSIS — Z30.433 ENCOUNTER FOR REMOVAL AND REINSERTION OF INTRAUTERINE CONTRACEPTIVE DEVICE: ICD-10-CM

## 2018-08-10 LAB
CONTROL LINE PRESENT WITH A CLEAR BACKGROUND (YES/NO): YES YES/NO
KIT LOT #: NORMAL NUMERIC
PREGNANCY TEST, URINE: NEGATIVE

## 2018-08-10 PROCEDURE — 81025 URINE PREGNANCY TEST: CPT | Performed by: OBSTETRICS & GYNECOLOGY

## 2018-08-10 PROCEDURE — 58300 INSERT INTRAUTERINE DEVICE: CPT | Performed by: OBSTETRICS & GYNECOLOGY

## 2018-08-10 PROCEDURE — 58301 REMOVE INTRAUTERINE DEVICE: CPT | Performed by: OBSTETRICS & GYNECOLOGY

## 2018-08-10 NOTE — PROCEDURES
IUD Insertion     Pregnancy Results: negative from urine test   Birth control method(s) used:  ; date last used:  Nicky IUD    Consent signed.   Procedure discussed with the patient in detail including indication, risks, benefits, alternatives and complicat

## 2018-08-10 NOTE — PROCEDURES
IUD Removal     Pregnancy Results: negative from urine test   Birth control method(s) used:  ; date last used:  Nicky IUD  Consent signed.   Procedure discussed with the patient in detail including indication, risks, benefits, alternatives and complications

## 2018-11-07 ENCOUNTER — TELEPHONE (OUTPATIENT)
Dept: OBGYN CLINIC | Facility: CLINIC | Age: 28
End: 2018-11-07

## 2018-11-07 NOTE — TELEPHONE ENCOUNTER
Dr. Orly Michel Pt has a Pap smear done on 3/22/2018 ASCUS pap with + HPV. Pt was informed to f/u in 6 months. Pt hasn't schedule any sudeep. May we send a certified letter. Please send it back to Clinical Staff.         Please Advs. Dr. Orly Michel

## 2018-11-26 ENCOUNTER — NURSE TRIAGE (OUTPATIENT)
Dept: OTHER | Age: 28
End: 2018-11-26

## 2018-11-26 ENCOUNTER — OFFICE VISIT (OUTPATIENT)
Dept: FAMILY MEDICINE CLINIC | Facility: CLINIC | Age: 28
End: 2018-11-26
Payer: COMMERCIAL

## 2018-11-26 VITALS
RESPIRATION RATE: 12 BRPM | TEMPERATURE: 99 F | WEIGHT: 120.63 LBS | DIASTOLIC BLOOD PRESSURE: 66 MMHG | HEIGHT: 61 IN | BODY MASS INDEX: 22.78 KG/M2 | SYSTOLIC BLOOD PRESSURE: 105 MMHG | HEART RATE: 62 BPM

## 2018-11-26 DIAGNOSIS — R59.0 INGUINAL LYMPHADENOPATHY: Primary | ICD-10-CM

## 2018-11-26 DIAGNOSIS — L73.1 INGROWN HAIR: ICD-10-CM

## 2018-11-26 PROCEDURE — 99212 OFFICE O/P EST SF 10 MIN: CPT | Performed by: FAMILY MEDICINE

## 2018-11-26 PROCEDURE — 99214 OFFICE O/P EST MOD 30 MIN: CPT | Performed by: FAMILY MEDICINE

## 2018-11-26 RX ORDER — IBUPROFEN 600 MG/1
600 TABLET ORAL EVERY 8 HOURS PRN
Qty: 30 TABLET | Refills: 0 | Status: SHIPPED | OUTPATIENT
Start: 2018-11-26 | End: 2018-12-06

## 2018-11-26 NOTE — TELEPHONE ENCOUNTER
Advised patient of Dr. Tadeo Mack note. Patient verbalized understanding. Patient states she would be able to come in sometime tomorrow at 5:30pm or later. Or could she be added on to schedule tonight? please advise.

## 2018-11-26 NOTE — TELEPHONE ENCOUNTER
Action Requested: Summary for Provider     []  Critical Lab, Recommendations Needed  [] Need Additional Advice  []   FYI    []   Need Orders  [] Need Medications Sent to Pharmacy  []  Other     SUMMARY:    Patient needs a late night appointment and none av

## 2018-11-26 NOTE — TELEPHONE ENCOUNTER
Of course. Otherwise I do have openings tomorrow and she can come in tomorrow but I do not work late but I will work her in for this focused visit .

## 2018-11-27 NOTE — PROGRESS NOTES
Patient ID: Nohelia Lynn is a 25year old female. HPI  Patient presents with:  Bump: right groin area    Last week she felt a small bump in the right anterior groin. She states that the day went on to get bigger. She had no fevers.   She was just si Dipropionate 0.05 % External Lotion Use bid as directed Disp: 60 mL Rfl: 3   Adapalene 0.1 % External Gel Use qd for acne Disp: 15 g Rfl: 2     Allergies:  Codeine                    PHYSICAL EXAM:   Physical Exam  Blood pressure 105/66, pulse 62, temperat member understands and agrees to plan. No Follow-up on file.       Cecilio Nice,   11/26/2018

## 2018-12-11 ENCOUNTER — TELEPHONE (OUTPATIENT)
Dept: OBGYN CLINIC | Facility: CLINIC | Age: 28
End: 2018-12-11

## 2018-12-11 NOTE — TELEPHONE ENCOUNTER
Pt voices twice now, she has been experiencing painful intercourse. Pt has no other symptoms. Pt wondering if the pain is coming from her IUD. [facility] out of town after today.  Appointment scheduled with Dr. Ebenezer Peters for 12/13 at 6:40 pm.

## 2018-12-11 NOTE — TELEPHONE ENCOUNTER
Pt stts she has pain during intercourse. Pt is not sure if it is due to IUD, asking for a call back.

## 2018-12-13 ENCOUNTER — OFFICE VISIT (OUTPATIENT)
Dept: OBGYN CLINIC | Facility: CLINIC | Age: 28
End: 2018-12-13
Payer: COMMERCIAL

## 2018-12-13 VITALS — WEIGHT: 124.38 LBS | BODY MASS INDEX: 24 KG/M2 | DIASTOLIC BLOOD PRESSURE: 70 MMHG | SYSTOLIC BLOOD PRESSURE: 112 MMHG

## 2018-12-13 DIAGNOSIS — T83.32XA INTRAUTERINE CONTRACEPTIVE DEVICE THREADS LOST, INITIAL ENCOUNTER: ICD-10-CM

## 2018-12-13 DIAGNOSIS — Z30.431 IUD CHECK UP: ICD-10-CM

## 2018-12-13 DIAGNOSIS — N94.10 DYSPAREUNIA, FEMALE: Primary | ICD-10-CM

## 2018-12-13 PROCEDURE — 99213 OFFICE O/P EST LOW 20 MIN: CPT | Performed by: OBSTETRICS & GYNECOLOGY

## 2018-12-14 NOTE — PROGRESS NOTES
[facility]  Obstetrics and Gynecology  Focused Gynecology Problem Exam  Jb Martinez MD    Lawrence Rosen is a 25year old female presenting for Gyn Exam (pt states has been having painful intercousre for the past 5 days and hasnt had any bleeding) 11 years           Use of Birth Control (if yes, specify type): Nicky IUD     Hx Prior Abnormal Pap: No              Past Medical History:   Diagnosis Date   • Anemia    • Decorative tattoo    • Irritable bowel syndrome (IBS)    • Lipid screening 9/17/2009 Outdoor occupation: Not Asked        Pt has a pacemaker: Not Asked        Pt has a defibrillator: Not Asked        Breast feeding: Not Asked        Reaction to local anesthetic: No    Social History Narrative      Not on file      ROS:   See HPI  PHYSICAL her symptoms do not resolve then it was not etiology. Patient is to get a pelvic ultrasound to evaluate for any pelvic pathology as a cause of her dyspareunia as well as to confirm IUD placement.   Patient understands all the above and I will contact her w

## 2018-12-15 ENCOUNTER — HOSPITAL ENCOUNTER (OUTPATIENT)
Dept: ULTRASOUND IMAGING | Age: 28
End: 2018-12-15
Attending: OBSTETRICS & GYNECOLOGY
Payer: COMMERCIAL

## 2018-12-15 ENCOUNTER — HOSPITAL ENCOUNTER (OUTPATIENT)
Dept: ULTRASOUND IMAGING | Facility: HOSPITAL | Age: 28
Discharge: HOME | End: 2018-12-15
Attending: OBSTETRICS & GYNECOLOGY
Payer: COMMERCIAL

## 2018-12-15 DIAGNOSIS — T83.32XA INTRAUTERINE CONTRACEPTIVE DEVICE THREADS LOST, INITIAL ENCOUNTER: ICD-10-CM

## 2018-12-15 DIAGNOSIS — N94.10 DYSPAREUNIA, FEMALE: ICD-10-CM

## 2018-12-15 PROCEDURE — 76856 US EXAM PELVIC COMPLETE: CPT | Performed by: OBSTETRICS & GYNECOLOGY

## 2018-12-15 PROCEDURE — 76830 TRANSVAGINAL US NON-OB: CPT | Performed by: OBSTETRICS & GYNECOLOGY

## 2019-02-14 NOTE — TELEPHONE ENCOUNTER
Letter generated and sent to [facility] to be sent as a [address] letter.

## 2020-07-02 NOTE — TELEPHONE ENCOUNTER
[facility] from Radiology states that she scheduled MRI w/ contrast for pt and pt states that she gets constipated from the contrast. [facility] suggested that pt call this office to request RX for something to help with constipation but pt refused and asked Santosh PCP: Josh Vaz 

## 2024-11-11 NOTE — LETTER
2/14/2019              [address] [place] [address] [address]         Dear Esteban Villeda,    This letter is to inform you that our office has made several attempts to reach you by phone without success.   We were attempting to contact Sheath removed intact.

## (undated) NOTE — LETTER
PPD Nurse Triage Call [address] [facility] [place] [address]  [phone]                    Patient Name:   Gabbi Chapman ([id number])   Sex: female  : 1990        Order Date:  2018  Authorizing Provider:   Natalya Dickens

## (undated) NOTE — MR AVS SNAPSHOT
[address]  [address] [facility]  [phone]               Thank you for choosing us for your health care visit with Mace Lover, POOJA.  We are glad to serve you and happy to provide you with this Visit Spotlime  You can access your MyChart to more actively manage your health care and view more details from this visit by going to [email or web address].   If you've recently had a stay at the Hospital you can access your discharge instructions i

## (undated) NOTE — MR AVS SNAPSHOT
[address]  [address] [facility]  [phone]               Thank you for choosing us for your health care visit with Xander Pichardo DO.   We are glad to serve you and happy to provide you with this summary Commonly known as:  VALTREX                Where to Get Your Medications      These medications were sent to [facility], [place] - [address] [phone], [phone] [address] [place] [address]     Phone:  [phone]

## (undated) NOTE — MR AVS SNAPSHOT
[facility]  [address] [place] [address] [phone]               Thank you for choosing us for your health care visit with Rustam Blunt MD.  We are glad to serve you and happy to provide you with this summary of your acquired. Please contact the Patient Business Office at [phone] if you have any questions related to insurance coverage. Thank you.          Reason for Today's Visit     Abdominal Pain     Bloating     Belching           Medical Issues Discussed Today Summaries. If you've been to the Emergency Department or your doctor's office, you can view your past visit information in Tippr by going to Visits < Visit Summaries. Tippr questions? Call [phone] for help.   Tippr is NOT to be used for urge

## (undated) NOTE — MR AVS SNAPSHOT
[address]  [address] [facility]  [phone]               Thank you for choosing us for your health care visit with Pee Elena DO.   We are glad to serve you and happy to provide you with this summary Reason for Today's Visit     Cold           Medical Issues Discussed Today     Sinusitis, unspecified chronicity, unspecified location    -  Primary    Abdominal bloating        Irritable bowel syndrome with constipation        Eructation          Instruct You can access your MyChart to more actively manage your health care and view more details from this visit by going to [email or web address].   If you've recently had a stay at the Hospital you can access your discharge instructions in [address] by andre

## (undated) NOTE — MR AVS SNAPSHOT
[address]  [address] [facility]  [phone]               Thank you for choosing us for your health care visit with Cecilio Nice DO.   We are glad to serve you and happy to provide you with this summary ValACYclovir HCl 1 G Tabs   TAKE TWO TABLETS BY MOUTH EVERY TWELVE HOURS   Commonly known as:  VALTREX                Where to Get Your Medications      These medications were sent to [facility] [address] [place] [address]. [phone]

## (undated) NOTE — Clinical Note
5/11/2017              [address] [facility] [address]         Dear Liliana Koehler records indicate that the tests ordered for you by Ankit Walters MD  have not been done.   If you have, in fact, already comple

## (undated) NOTE — LETTER
1/17/2018          To Whom It May Concern:    Donna Mac is currently under my medical care at this time. Jessica  Missed an appointment due to illness on 1/16/18.   Please contact our office if you have any question      Sincerely,    Ana Breaux,

## (undated) NOTE — MR AVS SNAPSHOT
[address]  [address] [facility]  [phone]               Thank you for choosing us for your health care visit with Niko Holly DO.   We are glad to serve you and happy to provide you with this summary Take 1/2 tablet po daily for 1 week and then go to 1 full tablet each day from then on. Commonly known as:  ZOLOFT                Where to Get Your Medications      These medications were sent to [facility]Y #[id number]- [place] - [address]

## (undated) NOTE — LETTER
6/5/2018              Jessica Ospina        [facility] [phone]         To whom it may concern,    Broderick Menon is currently a patient under my medical care. Patient was seen for illness and had to miss school today.   She can

## (undated) NOTE — IP AVS SNAPSHOT
[address]  [address] [facility] ~ [phone]                After Visit Summary   3/15/2017    Elida Camarena    MRN: [id number]           Visit Information        Provider Department    3/15/2017  8:30 AM [facility] ([address])    [address] [place] [address]   [phone]              We Ordered the Following      Normal Orders This Visit    XR UPPER GI AIR CONTRAST+SBS (CPT=74249) [90002 CPT(R)]

## (undated) NOTE — MR AVS SNAPSHOT
[address]  [address] [facility]  [phone]               Thank you for choosing us for your health care visit with Bharat Faulkner DPM.  We are glad to serve you and happy to provide you with this discharge instructions in [facility] by going to Visits < Admission Summaries. If you've been to the Emergency Department or your doctor's office, you can view your past visit information in [facility] by going to Visits < Visit Summaries. SensibleSelf questions?

## (undated) NOTE — LETTER
AUTHORIZATION FOR SURGICAL OPERATION OR OTHER PROCEDURE    1.  I hereby [facility] Dr. Nina , and [facility] assigned to my case to perform the following operation and/or procedure at the [facility]:    IUD removal and reinserti Time:  ________ A. M.  P.M.        Patient Name:  ______________________________________________________  (please print)      Patient signature:  ___________________________________________________             Relationship to Patient:

## (undated) NOTE — MR AVS SNAPSHOT
[facility]  [address] [place] [address] [phone]               Thank you for choosing us for your health care visit with Gayle Reis.  Rachel Che MD.  We are glad to serve you and happy to provide you with this summary of your visit injection, you need to stop breastfeeding for 24-48 hours after the procedure. IF A CHILD is scheduled for this procedure, parents should be advised that they will not be able to accompany the child in the testing room.  Parents will remain in the MRI Visits < Visit Summaries. MyChart questions? Call [phone] for help. [employer] is NOT to be used for urgent needs. For medical emergencies, dial 911.            Visit [employer] online at  [email or web address]

## (undated) NOTE — LETTER
11/26/2018              Jessica Ospina        [address] [place] [address]         To whom it may concern,    Michelle Pinto is currently a patient under my medical care.   Patient was seen today for her office visit and can return back to

## (undated) NOTE — Clinical Note
January 16, 2017         Kianna Mayes DO  [address] [facility]      Patient: Malika Bajwa   YOB: 1990   Date of Visit: 1/16/2017       Dear Dr. Connie Cunningham saw your patient, Malika Bajwa, on 1/16/20